# Patient Record
Sex: FEMALE | Race: WHITE | NOT HISPANIC OR LATINO | URBAN - METROPOLITAN AREA
[De-identification: names, ages, dates, MRNs, and addresses within clinical notes are randomized per-mention and may not be internally consistent; named-entity substitution may affect disease eponyms.]

---

## 2023-07-26 ENCOUNTER — INPATIENT (INPATIENT)
Facility: HOSPITAL | Age: 63
LOS: 2 days | Discharge: HOME | End: 2023-07-29
Attending: STUDENT IN AN ORGANIZED HEALTH CARE EDUCATION/TRAINING PROGRAM | Admitting: STUDENT IN AN ORGANIZED HEALTH CARE EDUCATION/TRAINING PROGRAM
Payer: COMMERCIAL

## 2023-07-26 VITALS
RESPIRATION RATE: 20 BRPM | DIASTOLIC BLOOD PRESSURE: 85 MMHG | OXYGEN SATURATION: 100 % | HEART RATE: 133 BPM | SYSTOLIC BLOOD PRESSURE: 114 MMHG | TEMPERATURE: 98 F

## 2023-07-26 LAB
ALBUMIN SERPL ELPH-MCNC: 3.8 G/DL — SIGNIFICANT CHANGE UP (ref 3.3–5)
ALP SERPL-CCNC: 86 U/L — SIGNIFICANT CHANGE UP (ref 40–120)
ALT FLD-CCNC: 38 U/L — HIGH (ref 4–33)
ANION GAP SERPL CALC-SCNC: 14 MMOL/L — SIGNIFICANT CHANGE UP (ref 7–14)
APTT BLD: 27.4 SEC — SIGNIFICANT CHANGE UP (ref 24.5–35.6)
AST SERPL-CCNC: 31 U/L — SIGNIFICANT CHANGE UP (ref 4–32)
BASE EXCESS BLDV CALC-SCNC: 2 MMOL/L — SIGNIFICANT CHANGE UP (ref -2–3)
BASOPHILS # BLD AUTO: 0.07 K/UL — SIGNIFICANT CHANGE UP (ref 0–0.2)
BASOPHILS NFR BLD AUTO: 0.7 % — SIGNIFICANT CHANGE UP (ref 0–2)
BILIRUB SERPL-MCNC: 1.7 MG/DL — HIGH (ref 0.2–1.2)
BLD GP AB SCN SERPL QL: NEGATIVE — SIGNIFICANT CHANGE UP
BLOOD GAS VENOUS COMPREHENSIVE RESULT: SIGNIFICANT CHANGE UP
BUN SERPL-MCNC: 21 MG/DL — SIGNIFICANT CHANGE UP (ref 7–23)
CALCIUM SERPL-MCNC: 10.2 MG/DL — SIGNIFICANT CHANGE UP (ref 8.4–10.5)
CHLORIDE BLDV-SCNC: 99 MMOL/L — SIGNIFICANT CHANGE UP (ref 96–108)
CHLORIDE SERPL-SCNC: 100 MMOL/L — SIGNIFICANT CHANGE UP (ref 98–107)
CO2 BLDV-SCNC: 29.2 MMOL/L — HIGH (ref 22–26)
CO2 SERPL-SCNC: 22 MMOL/L — SIGNIFICANT CHANGE UP (ref 22–31)
CREAT SERPL-MCNC: 0.68 MG/DL — SIGNIFICANT CHANGE UP (ref 0.5–1.3)
EGFR: 97 ML/MIN/1.73M2 — SIGNIFICANT CHANGE UP
EOSINOPHIL # BLD AUTO: 0.12 K/UL — SIGNIFICANT CHANGE UP (ref 0–0.5)
EOSINOPHIL NFR BLD AUTO: 1.2 % — SIGNIFICANT CHANGE UP (ref 0–6)
GAS PNL BLDV: 136 MMOL/L — SIGNIFICANT CHANGE UP (ref 136–145)
GAS PNL BLDV: SIGNIFICANT CHANGE UP
GLUCOSE BLDV-MCNC: 104 MG/DL — HIGH (ref 70–99)
GLUCOSE SERPL-MCNC: 104 MG/DL — HIGH (ref 70–99)
HCO3 BLDV-SCNC: 28 MMOL/L — SIGNIFICANT CHANGE UP (ref 22–29)
HCT VFR BLD CALC: 37.3 % — SIGNIFICANT CHANGE UP (ref 34.5–45)
HCT VFR BLDA CALC: 39 % — SIGNIFICANT CHANGE UP (ref 34.5–46.5)
HGB BLD CALC-MCNC: 13.1 G/DL — SIGNIFICANT CHANGE UP (ref 11.7–16.1)
HGB BLD-MCNC: 12.3 G/DL — SIGNIFICANT CHANGE UP (ref 11.5–15.5)
IANC: 6.05 K/UL — SIGNIFICANT CHANGE UP (ref 1.8–7.4)
IMM GRANULOCYTES NFR BLD AUTO: 0.3 % — SIGNIFICANT CHANGE UP (ref 0–0.9)
INR BLD: 1.19 RATIO — HIGH (ref 0.85–1.18)
LACTATE BLDV-MCNC: 1.7 MMOL/L — SIGNIFICANT CHANGE UP (ref 0.5–2)
LYMPHOCYTES # BLD AUTO: 2.71 K/UL — SIGNIFICANT CHANGE UP (ref 1–3.3)
LYMPHOCYTES # BLD AUTO: 26.1 % — SIGNIFICANT CHANGE UP (ref 13–44)
MCHC RBC-ENTMCNC: 28.2 PG — SIGNIFICANT CHANGE UP (ref 27–34)
MCHC RBC-ENTMCNC: 33 GM/DL — SIGNIFICANT CHANGE UP (ref 32–36)
MCV RBC AUTO: 85.6 FL — SIGNIFICANT CHANGE UP (ref 80–100)
MONOCYTES # BLD AUTO: 1.4 K/UL — HIGH (ref 0–0.9)
MONOCYTES NFR BLD AUTO: 13.5 % — SIGNIFICANT CHANGE UP (ref 2–14)
NEUTROPHILS # BLD AUTO: 6.05 K/UL — SIGNIFICANT CHANGE UP (ref 1.8–7.4)
NEUTROPHILS NFR BLD AUTO: 58.2 % — SIGNIFICANT CHANGE UP (ref 43–77)
NRBC # BLD: 0 /100 WBCS — SIGNIFICANT CHANGE UP (ref 0–0)
NRBC # FLD: 0 K/UL — SIGNIFICANT CHANGE UP (ref 0–0)
NT-PROBNP SERPL-SCNC: 168 PG/ML — SIGNIFICANT CHANGE UP
PCO2 BLDV: 47 MMHG — SIGNIFICANT CHANGE UP (ref 39–52)
PH BLDV: 7.38 — SIGNIFICANT CHANGE UP (ref 7.32–7.43)
PLATELET # BLD AUTO: 307 K/UL — SIGNIFICANT CHANGE UP (ref 150–400)
PO2 BLDV: 28 MMHG — SIGNIFICANT CHANGE UP (ref 25–45)
POTASSIUM BLDV-SCNC: 3.6 MMOL/L — SIGNIFICANT CHANGE UP (ref 3.5–5.1)
POTASSIUM SERPL-MCNC: 3.6 MMOL/L — SIGNIFICANT CHANGE UP (ref 3.5–5.3)
POTASSIUM SERPL-SCNC: 3.6 MMOL/L — SIGNIFICANT CHANGE UP (ref 3.5–5.3)
PROT SERPL-MCNC: 6.8 G/DL — SIGNIFICANT CHANGE UP (ref 6–8.3)
PROTHROM AB SERPL-ACNC: 13.4 SEC — HIGH (ref 9.5–13)
RBC # BLD: 4.36 M/UL — SIGNIFICANT CHANGE UP (ref 3.8–5.2)
RBC # FLD: 11.4 % — SIGNIFICANT CHANGE UP (ref 10.3–14.5)
RH IG SCN BLD-IMP: NEGATIVE — SIGNIFICANT CHANGE UP
SAO2 % BLDV: 41.9 % — LOW (ref 67–88)
SODIUM SERPL-SCNC: 136 MMOL/L — SIGNIFICANT CHANGE UP (ref 135–145)
TROPONIN T, HIGH SENSITIVITY RESULT: 10 NG/L — SIGNIFICANT CHANGE UP
TSH SERPL-MCNC: <0.1 UIU/ML — LOW (ref 0.27–4.2)
WBC # BLD: 10.38 K/UL — SIGNIFICANT CHANGE UP (ref 3.8–10.5)
WBC # FLD AUTO: 10.38 K/UL — SIGNIFICANT CHANGE UP (ref 3.8–10.5)

## 2023-07-26 PROCEDURE — 99285 EMERGENCY DEPT VISIT HI MDM: CPT

## 2023-07-26 PROCEDURE — 93971 EXTREMITY STUDY: CPT | Mod: 26,LT

## 2023-07-26 PROCEDURE — 71045 X-RAY EXAM CHEST 1 VIEW: CPT | Mod: 26

## 2023-07-26 NOTE — ED PROVIDER NOTE - ATTENDING CONTRIBUTION TO CARE
This is a patient who states that she has not seen a doctor in the past few years.  She states for the past few weeks she has been feeling unwell.  For approximately 3 weeks she has been experiencing chest and back pain as well as shortness of breath.  Patient states the symptoms worsen when she goes outside.  States she does not like to take medications so she really has not tried anything for this.  States left lower extremity is slightly more swollen than it has been in the past.  Patient also admits to decreased appetite secondary to being nauseous with intermittent episodes of nonbilious nonbloody vomiting.  States that she does not vomit every day but has been doing so on and off for the past few weeks.  No sick contacts no recent car rides normal flights no history of blood clots  Patient is a former smoker.  Patient does not drink alcohol or use drugs.  Patient tachycardic upon initial assessment with some shortness of breath.  We will check basic labs including troponin and TSH.  Patient does look volume depleted.  Due to tachycardia and shortness of breath with some lower extremity swelling of 1 leg we will pursue CT angio for rule out PE  Is normal patient is normotensive and saturating well on room air.  General: Well appearing, well nourished, in no distress  Head: Normocephalic, atraumatic  Eyes: Conjunctiva clear, sclera non-icteric, EOM intact  Mouth: Mucous membranes dry  Neck: Supple  Heart: tachycardiac, no murmur or gallop  Lungs: Clear to auscultation  Abdomen: Bowel sounds present, soft, mild generalized ttp, non distended  Back: Spine normal without deformity or tenderness, no CVA tenderness  Extremities: No amputations or deformities, cyanosis, L>R non pitting no erythema no warmth   Musculoskeletal: No crepitation, defects or decreased range of motion, strength intact throughout, pulses intact  Neurologic: No gross deficits  Psychiatric: Oriented X3, normal mood and affect  Skin: Warm,dry. Good turgor, no rash, unusual bruising, Cap refill <2 seconds

## 2023-07-26 NOTE — ED ADULT NURSE NOTE - NSFALLUNIVINTERV_ED_ALL_ED
Bed/Stretcher in lowest position, wheels locked, appropriate side rails in place/Call bell, personal items and telephone in reach/Instruct patient to call for assistance before getting out of bed/chair/stretcher/Non-slip footwear applied when patient is off stretcher/[place] to call system/Physically safe environment - no spills, clutter or unnecessary equipment/Purposeful proactive rounding/Room/bathroom lighting operational, light cord in reach

## 2023-07-26 NOTE — ED ADULT TRIAGE NOTE - CHIEF COMPLAINT QUOTE
pt c/o palpitations and chest pain x 3 weeks with dyspnea on exertion. also c/o dizziness and upper back pain. seen by primary Dr today sent in for evaluation, EKG showed sinus tachycardia. denies any other complaints. appears uncomfortable in triage. pt c/o palpitations and chest pain x 3 weeks with dyspnea on exertion. also c/o dizziness and upper back pain. seen by primary Dr today sent in for evaluation, EKG showed sinus tachycardia. denies any other complaints. appears uncomfortable in triage, tachypneic, sating 100%. no PMH

## 2023-07-26 NOTE — ED PROVIDER NOTE - PHYSICAL EXAMINATION
GENERAL: Awake, alert, NAD  LUNGS: CTAB, no wheezes or crackles   CARDIAC: tachy no m/r/g  ABDOMEN: Soft, non tender, non distended, no rebound, no guarding  BACK: No midline spinal tenderness, no CVA tenderness  EXT: Left lower extremity swollen as compared to right  NEURO: A&Ox3. Moving all extremities.  SKIN: Warm and dry. No rash.  PSYCH: Normal affect.

## 2023-07-26 NOTE — ED ADULT NURSE NOTE - OBJECTIVE STATEMENT
Pt c/o of CP that does not radiate or increase with respiratory effort. Pt also endorses middle back pain and BLLE edema at the knees. Denies SOB

## 2023-07-26 NOTE — ED ADULT NURSE NOTE - CHIEF COMPLAINT QUOTE
pt c/o palpitations and chest pain x 3 weeks with dyspnea on exertion. also c/o dizziness and upper back pain. seen by primary Dr today sent in for evaluation, EKG showed sinus tachycardia. denies any other complaints. appears uncomfortable in triage, tachypneic, sating 100%. no PMH

## 2023-07-26 NOTE — ED PROVIDER NOTE - OBJECTIVE STATEMENT
63-year-old female chief complaint chest pain shortness of breath.  Patient has no relevant medical conditions.  Patient does not take any medications or blood thinners.  Patient notes over the past couple weeks she has been having increasing work of breathing in conjunction with excretion Patient is also stating she has been having shortness of breath with exertion as well.  Patient notes her left lower extremity is also more swollen than it has been in the past.  Patient had no recent long car rides or flights.

## 2023-07-26 NOTE — ED PROVIDER NOTE - CLINICAL SUMMARY MEDICAL DECISION MAKING FREE TEXT BOX
Given history and physical possible hypovolemia versus PE versus arrhythmia versus less likely being infectious in etiology given the lack of symptoms patient has been having nausea and vomiting so more likely dehydration but will have to work-up PE given patient has new left lower extremity swelling.  Probable admission for echo given patient has not had a cardiac work-up done

## 2023-07-27 DIAGNOSIS — E05.90 THYROTOXICOSIS, UNSPECIFIED WITHOUT THYROTOXIC CRISIS OR STORM: ICD-10-CM

## 2023-07-27 DIAGNOSIS — R09.89 OTHER SPECIFIED SYMPTOMS AND SIGNS INVOLVING THE CIRCULATORY AND RESPIRATORY SYSTEMS: ICD-10-CM

## 2023-07-27 DIAGNOSIS — Z98.890 OTHER SPECIFIED POSTPROCEDURAL STATES: Chronic | ICD-10-CM

## 2023-07-27 DIAGNOSIS — R42 DIZZINESS AND GIDDINESS: ICD-10-CM

## 2023-07-27 DIAGNOSIS — R00.0 TACHYCARDIA, UNSPECIFIED: ICD-10-CM

## 2023-07-27 DIAGNOSIS — Z29.9 ENCOUNTER FOR PROPHYLACTIC MEASURES, UNSPECIFIED: ICD-10-CM

## 2023-07-27 DIAGNOSIS — Z98.891 HISTORY OF UTERINE SCAR FROM PREVIOUS SURGERY: Chronic | ICD-10-CM

## 2023-07-27 DIAGNOSIS — R79.89 OTHER SPECIFIED ABNORMAL FINDINGS OF BLOOD CHEMISTRY: ICD-10-CM

## 2023-07-27 LAB
ALBUMIN SERPL ELPH-MCNC: 3.9 G/DL — SIGNIFICANT CHANGE UP (ref 3.3–5)
ALP SERPL-CCNC: 90 U/L — SIGNIFICANT CHANGE UP (ref 40–120)
ALT FLD-CCNC: 35 U/L — HIGH (ref 4–33)
ANION GAP SERPL CALC-SCNC: 18 MMOL/L — HIGH (ref 7–14)
AST SERPL-CCNC: 29 U/L — SIGNIFICANT CHANGE UP (ref 4–32)
BILIRUB SERPL-MCNC: 1.8 MG/DL — HIGH (ref 0.2–1.2)
BUN SERPL-MCNC: 18 MG/DL — SIGNIFICANT CHANGE UP (ref 7–23)
CALCIUM SERPL-MCNC: 10.3 MG/DL — SIGNIFICANT CHANGE UP (ref 8.4–10.5)
CHLORIDE SERPL-SCNC: 100 MMOL/L — SIGNIFICANT CHANGE UP (ref 98–107)
CO2 SERPL-SCNC: 22 MMOL/L — SIGNIFICANT CHANGE UP (ref 22–31)
CREAT SERPL-MCNC: 0.61 MG/DL — SIGNIFICANT CHANGE UP (ref 0.5–1.3)
EGFR: 100 ML/MIN/1.73M2 — SIGNIFICANT CHANGE UP
GLUCOSE SERPL-MCNC: 125 MG/DL — HIGH (ref 70–99)
HCT VFR BLD CALC: 36.3 % — SIGNIFICANT CHANGE UP (ref 34.5–45)
HGB BLD-MCNC: 12.1 G/DL — SIGNIFICANT CHANGE UP (ref 11.5–15.5)
MCHC RBC-ENTMCNC: 28.5 PG — SIGNIFICANT CHANGE UP (ref 27–34)
MCHC RBC-ENTMCNC: 33.3 GM/DL — SIGNIFICANT CHANGE UP (ref 32–36)
MCV RBC AUTO: 85.6 FL — SIGNIFICANT CHANGE UP (ref 80–100)
NRBC # BLD: 0 /100 WBCS — SIGNIFICANT CHANGE UP (ref 0–0)
NRBC # FLD: 0 K/UL — SIGNIFICANT CHANGE UP (ref 0–0)
PLATELET # BLD AUTO: 277 K/UL — SIGNIFICANT CHANGE UP (ref 150–400)
POTASSIUM SERPL-MCNC: 3.6 MMOL/L — SIGNIFICANT CHANGE UP (ref 3.5–5.3)
POTASSIUM SERPL-SCNC: 3.6 MMOL/L — SIGNIFICANT CHANGE UP (ref 3.5–5.3)
PROT SERPL-MCNC: 7.2 G/DL — SIGNIFICANT CHANGE UP (ref 6–8.3)
RBC # BLD: 4.24 M/UL — SIGNIFICANT CHANGE UP (ref 3.8–5.2)
RBC # FLD: 11.6 % — SIGNIFICANT CHANGE UP (ref 10.3–14.5)
SODIUM SERPL-SCNC: 140 MMOL/L — SIGNIFICANT CHANGE UP (ref 135–145)
T4 FREE SERPL-MCNC: 3.3 NG/DL — HIGH (ref 0.9–1.8)
TROPONIN T, HIGH SENSITIVITY RESULT: 10 NG/L — SIGNIFICANT CHANGE UP
WBC # BLD: 6.87 K/UL — SIGNIFICANT CHANGE UP (ref 3.8–10.5)
WBC # FLD AUTO: 6.87 K/UL — SIGNIFICANT CHANGE UP (ref 3.8–10.5)

## 2023-07-27 PROCEDURE — 12345: CPT | Mod: NC,GC

## 2023-07-27 PROCEDURE — 99223 1ST HOSP IP/OBS HIGH 75: CPT | Mod: GC

## 2023-07-27 PROCEDURE — 71275 CT ANGIOGRAPHY CHEST: CPT | Mod: 26,MA

## 2023-07-27 PROCEDURE — 76536 US EXAM OF HEAD AND NECK: CPT | Mod: 26

## 2023-07-27 PROCEDURE — 93306 TTE W/DOPPLER COMPLETE: CPT | Mod: 26

## 2023-07-27 PROCEDURE — 99223 1ST HOSP IP/OBS HIGH 75: CPT

## 2023-07-27 RX ORDER — ACETAMINOPHEN 500 MG
650 TABLET ORAL ONCE
Refills: 0 | Status: COMPLETED | OUTPATIENT
Start: 2023-07-27 | End: 2023-07-27

## 2023-07-27 RX ORDER — LIDOCAINE 4 G/100G
1 CREAM TOPICAL ONCE
Refills: 0 | Status: COMPLETED | OUTPATIENT
Start: 2023-07-27 | End: 2023-07-27

## 2023-07-27 RX ORDER — METOPROLOL TARTRATE 50 MG
12.5 TABLET ORAL EVERY 12 HOURS
Refills: 0 | Status: DISCONTINUED | OUTPATIENT
Start: 2023-07-27 | End: 2023-07-29

## 2023-07-27 RX ORDER — SODIUM CHLORIDE 9 MG/ML
1000 INJECTION, SOLUTION INTRAVENOUS ONCE
Refills: 0 | Status: COMPLETED | OUTPATIENT
Start: 2023-07-27 | End: 2023-07-27

## 2023-07-27 RX ORDER — CYCLOBENZAPRINE HYDROCHLORIDE 10 MG/1
5 TABLET, FILM COATED ORAL ONCE
Refills: 0 | Status: COMPLETED | OUTPATIENT
Start: 2023-07-27 | End: 2023-07-27

## 2023-07-27 RX ADMIN — SODIUM CHLORIDE 1000 MILLILITER(S): 9 INJECTION, SOLUTION INTRAVENOUS at 01:22

## 2023-07-27 RX ADMIN — CYCLOBENZAPRINE HYDROCHLORIDE 5 MILLIGRAM(S): 10 TABLET, FILM COATED ORAL at 01:58

## 2023-07-27 RX ADMIN — Medication 12.5 MILLIGRAM(S): at 17:31

## 2023-07-27 NOTE — CONSULT NOTE ADULT - ATTENDING COMMENTS
63F with new hyperthyroidism, start low dose beta blocker for symptoms.  Differential Graves disease vs hot nodule vs thyroiditis.  Thyroid nodules seen on ultrasound can be further assessed as outpatient.  Uptake and scan to be considered as outpatient (cannot do now due to receiving iodinated contrast), FNA would only be considered if uptake and scan show a cold nodule. Follow up antibodies. Await free T4 to decide about methimazole. Mild elevation bilirubin noted.    Domingo Bee MD  Division of Endocrinology  Pager: [phone]    If after 6PM or before 9AM, or on weekends/holidays, please call endocrine answering service for assistance ([phone]).  For nonurgent matters email [email or web address] for assistance. 63F with new hyperthyroidism, start low dose beta blocker for symptoms.  Differential Graves disease vs hot nodule vs thyroiditis.  Thyroid nodules seen on ultrasound can be further assessed as outpatient.  Uptake and scan to be considered as outpatient (cannot do now due to receiving iodinated contrast), FNA would only be considered if uptake and scan show a cold nodule. Follow up antibodies. Await free T4 to decide about methimazole. Mild elevation bilirubin noted.  Patient would like to follow up in [place] with endocrine.    Domingo Bee MD  Division of Endocrinology  Pager: [phone]    If after 6PM or before 9AM, or on weekends/holidays, please call endocrine answering service for assistance ([phone]).  For nonurgent matters email [email or web address] for assistance.

## 2023-07-27 NOTE — H&P ADULT - ASSESSMENT
Pt is a 63-year-old female with no known PMHx who comes in with dizziness, nausea, vomiting over the past several weeks. Found to have elevated T3 and T4, c/f thyrotoxicosis

## 2023-07-27 NOTE — ED ADULT NURSE REASSESSMENT NOTE - NS ED NURSE REASSESS COMMENT FT1
Break coverage RN: pt A&Ox4 resting on stretcher. Respirations even and unlabored. Pt c/o lower back pain - medication administered per order for pain. Pt offers no additional complaints at this time. IV site patent - no redness or swelling noted. pt well appearing. pending transport to bed assignment.

## 2023-07-27 NOTE — H&P ADULT - NSHPLABSRESULTS_GEN_ALL_CORE
LABS:                          12.3   10.38 )-----------( 307      ( 26 Jul 2023 21:14 )             37.3     07-26    136  |  100  |  21  ----------------------------<  104<H>  3.6   |  22  |  0.68    Ca    10.2      26 Jul 2023 21:14    TPro  6.8  /  Alb  3.8  /  TBili  1.7<H>  /  DBili  x   /  AST  31  /  ALT  38<H>  /  AlkPhos  86  07-26    LIVER FUNCTIONS - ( 26 Jul 2023 21:14 )  Alb: 3.8 g/dL / Pro: 6.8 g/dL / ALK PHOS: 86 U/L / ALT: 38 U/L / AST: 31 U/L / GGT: x           PT/INR - ( 26 Jul 2023 21:14 )   PT: 13.4 sec;   INR: 1.19 ratio         PTT - ( 26 Jul 2023 21:14 )  PTT:27.4 sec  Urinalysis Basic - ( 26 Jul 2023 21:14 )    Color: x / Appearance: x / SG: x / pH: x  Gluc: 104 mg/dL / Ketone: x  / Bili: x / Urobili: x   Blood: x / Protein: x / Nitrite: x   Leuk Esterase: x / RBC: x / WBC x   Sq Epi: x / Non Sq Epi: x / Bacteria: x LABS:                          12.3   10.38 )-----------( 307      ( 26 Jul 2023 21:14 )             37.3     07-26    136  |  100  |  21  ----------------------------<  104<H>  3.6   |  22  |  0.68    Ca    10.2      26 Jul 2023 21:14    TPro  6.8  /  Alb  3.8  /  TBili  1.7<H>  /  DBili  x   /  AST  31  /  ALT  38<H>  /  AlkPhos  86  07-26    Lipase: 29 U/L (07.26.23 @ 21:14)    PT/INR - ( 26 Jul 2023 21:14 )   PT: 13.4 sec;   INR: 1.19 ratio    PTT - ( 26 Jul 2023 21:14 )  PTT:27.4 sec    Thyroid Stimulating Hormone, Serum: <0.10 uIU/mL (07.26.23 @ 21:14)  T4, Serum: 13.87 ug/dL (07.26.23 @ 21:14)  Triiodothyronine, Total (T3 Total): 348 ng/dL (07.26.23 @ 21:14)    Troponin T, High Sensitivity Result: 10 ng/L (07.27.23 @ 01:53)  Troponin T, High Sensitivity Result: 10 ng/L (07.26.23 @ 21:14)    Pro-Brain Natriuretic Peptide: 168 pg/mL (07.26.23 @ 21:14)    < from: CT Angio Chest PE Protocol w/ IV Cont (07.27.23 @ 00:39) >  LUNGS AND AIRWAYS: Patent central airways. No lung consolidation, suspicious nodule, or mass. Few scattered calcified granuloma. Mild bibasilar dependent and platelike atelectasis.  PLEURA: No pleural effusion.  MEDIASTINUM AND PAPI: No lymphadenopathy.  VESSELS: Adequate pulmonary arterial opacification. Evaluation of subsegmental branches degraded by motion and mixing artifact. No pulmonary embolism in the main pulmonary arteries, lobar branches, and segmental branches. Main pulmonary artery is not dilated. Thoracic aorta is normal in caliber.  HEART: Heart size is normal. No pericardial effusion.  CHEST WALL AND LOWER NECK: Within normal limits.  VISUALIZED UPPER ABDOMEN: Within normal limits.  BONES: Degenerative changes of the spine.  IMPRESSION: No pulmonary embolism. No acute parenchymal or pleural disease.   < end of copied text > LABS:                          12.3   10.38 )-----------( 307      ( 26 Jul 2023 21:14 )             37.3     07-26    136  |  100  |  21  ----------------------------<  104<H>  3.6   |  22  |  0.68    Ca    10.2      26 Jul 2023 21:14    TPro  6.8  /  Alb  3.8  /  TBili  1.7<H>  /  DBili  x   /  AST  31  /  ALT  38<H>  /  AlkPhos  86  07-26    Lipase: 29 U/L (07.26.23 @ 21:14)    PT/INR - ( 26 Jul 2023 21:14 )   PT: 13.4 sec;   INR: 1.19 ratio    PTT - ( 26 Jul 2023 21:14 )  PTT:27.4 sec    Thyroid Stimulating Hormone, Serum: <0.10 uIU/mL (07.26.23 @ 21:14)  T4, Serum: 13.87 ug/dL (07.26.23 @ 21:14)  Triiodothyronine, Total (T3 Total): 348 ng/dL (07.26.23 @ 21:14)    Troponin T, High Sensitivity Result: 10 ng/L (07.27.23 @ 01:53)  Troponin T, High Sensitivity Result: 10 ng/L (07.26.23 @ 21:14)    Pro-Brain Natriuretic Peptide: 168 pg/mL (07.26.23 @ 21:14)    < from: CT Angio Chest PE Protocol w/ IV Cont (07.27.23 @ 00:39) >  LUNGS AND AIRWAYS: Patent central airways. No lung consolidation, suspicious nodule, or mass. Few scattered calcified granuloma. Mild bibasilar dependent and platelike atelectasis.  PLEURA: No pleural effusion.  MEDIASTINUM AND PAPI: No lymphadenopathy.  VESSELS: Adequate pulmonary arterial opacification. Evaluation of subsegmental branches degraded by motion and mixing artifact. No pulmonary embolism in the main pulmonary arteries, lobar branches, and segmental branches. Main pulmonary artery is not dilated. Thoracic aorta is normal in caliber.  HEART: Heart size is normal. No pericardial effusion.  CHEST WALL AND LOWER NECK: Within normal limits.  VISUALIZED UPPER ABDOMEN: Within normal limits.  BONES: Degenerative changes of the spine.  IMPRESSION: No pulmonary embolism. No acute parenchymal or pleural disease.   < end of copied text >    EKG personally reviewed and interpreted - ST 124bpm, QTc 433ms

## 2023-07-27 NOTE — PATIENT PROFILE ADULT - FALL HARM RISK - UNIVERSAL INTERVENTIONS
Bed in lowest position, wheels locked, appropriate side rails in place/Call bell, personal items and telephone in reach/Instruct patient to call for assistance before getting out of bed or chair/Non-slip footwear when patient is out of bed/[place] to call system/Physically safe environment - no spills, clutter or unnecessary equipment/Purposeful Proactive Rounding/Room/bathroom lighting operational, light cord in reach

## 2023-07-27 NOTE — H&P ADULT - NSHPPHYSICALEXAM_GEN_ALL_CORE
Vital Signs Last 24 Hrs  T(C): 36.3 (27 Jul 2023 03:49), Max: 37 (27 Jul 2023 01:15)  T(F): 97.3 (27 Jul 2023 03:49), Max: 98.6 (27 Jul 2023 01:15)  HR: 77 (27 Jul 2023 03:49) (77 - 133)  BP: 108/59 (27 Jul 2023 03:49) (108/59 - 136/80)  BP(mean): --  RR: 15 (27 Jul 2023 03:49) (15 - 20)  SpO2: 96% (27 Jul 2023 03:49) (96% - 100%)    Parameters below as of 27 Jul 2023 03:49  Patient On (Oxygen Delivery Method): room air        CONSTITUTIONAL: Well-groomed, in no apparent distress  EYES: No conjunctival or scleral injection, non-icteric;   ENMT: No external nasal lesions; MMM  NECK: Trachea midline without palpable neck mass; thyroid not enlarged and non-tender  RESPIRATORY: Breathing comfortably; no dullness to percussion; lungs CTA without wheeze/rhonchi/rales  CARDIOVASCULAR: +S1S2, RRR, no M/G/R; pedal pulses full and symmetric; no lower extremity edema  GASTROINTESTINAL: No palpable masses or tenderness, +BS throughout, no rebound/guarding; no hepatosplenomegaly; no hernia palpated  LYMPHATIC: No cervical LAD or tenderness  SKIN: No rashes or ulcers noted  NEUROLOGIC: CN II-XII intact; sensation intact in LEs b/l to light touch  PSYCHIATRIC: A+O x 3; mood and affect appropriate; appropriate insight and judgment Vital Signs Last 24 Hrs  T(C): 36.3 (27 Jul 2023 03:49), Max: 37 (27 Jul 2023 01:15)  T(F): 97.3 (27 Jul 2023 03:49), Max: 98.6 (27 Jul 2023 01:15)  HR: 77 (27 Jul 2023 03:49) (77 - 133)  BP: 108/59 (27 Jul 2023 03:49) (108/59 - 136/80)  BP(mean): --  RR: 15 (27 Jul 2023 03:49) (15 - 20)  SpO2: 96% (27 Jul 2023 03:49) (96% - 100%)    Parameters below as of 27 Jul 2023 03:49  Patient On (Oxygen Delivery Method): room air        CONSTITUTIONAL: Well-groomed, in no apparent distress  EYES: No conjunctival or scleral injection, non-icteric   ENMT: No external nasal lesions; MMM  NECK: Trachea midline without palpable neck mass; thyroid not enlarged and non-tender  RESPIRATORY: Breathing comfortably; lungs CTA without wheeze/rhonchi/rales  CARDIOVASCULAR: +S1S2, RRR, no M/G/R; no lower extremity edema  GASTROINTESTINAL: No palpable masses or tenderness, +BS throughout, no rebound/guarding  LYMPHATIC: No cervical LAD or tenderness  SKIN: No rashes or ulcers noted  NEUROLOGIC: CN II-XII nonfocal, sensation intact in LEs b/l to light touch  PSYCHIATRIC: A+O x 3; mood and affect appropriate; appropriate insight and judgment Vital Signs Last 24 Hrs  T(C): 36.3 (27 Jul 2023 03:49), Max: 37 (27 Jul 2023 01:15)  T(F): 97.3 (27 Jul 2023 03:49), Max: 98.6 (27 Jul 2023 01:15)  HR: 77 (27 Jul 2023 03:49) (77 - 133)  BP: 108/59 (27 Jul 2023 03:49) (108/59 - 136/80)  RR: 15 (27 Jul 2023 03:49) (15 - 20)  SpO2: 96% (27 Jul 2023 03:49) (96% - 100%)    Parameters below as of 27 Jul 2023 03:49  Patient On (Oxygen Delivery Method): room air    CONSTITUTIONAL: Well-groomed, in no apparent distress  EYES: No conjunctival or scleral injection, non-icteric   ENMT: No external nasal lesions; MMM  NECK: Trachea midline without palpable neck mass; thyroid not enlarged and non-tender  RESPIRATORY: Breathing comfortably; lungs CTA without wheeze/rhonchi/rales  CARDIOVASCULAR: +S1S2, RRR, no M/G/R; no lower extremity edema  GASTROINTESTINAL: No palpable masses or tenderness, +BS throughout, no rebound/guarding  LYMPHATIC: No cervical LAD or tenderness  SKIN - limited skin exam: No rashes or ulcers noted   NEUROLOGIC: CN II-XII nonfocal, sensation intact, strength 5/5 x4 extremities, FTN intact b/l  PSYCHIATRIC: A+O x 3; mood and affect appropriate; appropriate insight and judgment

## 2023-07-27 NOTE — CONSULT NOTE ADULT - ASSESSMENT
63F with no known PMHx who was in usual state of health until developing n/v, dizziness, palpitations, SOB on exertion 3 weeks ago. Found to have hyperthyroidism with TSH <0.10, FT4 pending.    #Hyperthyroidism  TSH <0.10, TT3 elevated 348  TUS: hyperemic thyroid, L solid nodule 1.8 x 1.6, R hypoechoic nodule 0.4 x 0.4 x 0.5  Most likely Grave's disease given overall hyperemic thyroid.  Could be also be hyperfunctioning thyroid nodule but less likely.  Still remains tachycardic up to 100s at rest, worsens with movement/exertion.  - Recommend start metoprolol 12.5mg BID. Monitor BP.  - Check TSH receptor, TSI - high sensitivity, specificity for Grave's  - Follow up FT4 - degree of elevation will determine treatment  - Needs [place] follow up with [place]ocrin[place] [facility] [place] to schedule appointment.  - Thyroid nodules do not need FNA at this time.  - Received contrast so cannot perform Thyroid Uptake Scan until 6 weeks later (outpatient).  - Trend CBC, LFT's as this is important to monitor in setting of potential thionamide use    #Tachycardia  - Recommend start metoprolol 12.5mg BID. Monitor BP.    #Elevated LFT  - Trend LFT    Discussed with Dr. Jacob Farrar MD  Endocrine Fellow  Can be reached via teams. For follow up questions, discharge recommendations, or new consults, please call answering service at [phone] (weekdays); [phone] (nights/weekends). 63F with no known PMHx who was in usual state of health until developing n/v, dizziness, palpitations, SOB on exertion 3 weeks ago. Found to have hyperthyroidism with TSH <0.10, FT4 pending.    #Hyperthyroidism  TSH <0.10, TT3 elevated 348  TUS: hyperemic thyroid, L solid nodule 1.8 x 1.6, R hypoechoic nodule 0.4 x 0.4 x 0.5  Most likely Grave's disease given overall hyperemic thyroid.  Could be also be hyperfunctioning thyroid nodule but less likely.  Still remains tachycardic up to 100s at rest, worsens with movement/exertion.  - Recommend start metoprolol 12.5mg BID. Monitor BP.  - Check TSH receptor, TSI - high sensitivity, specificity for Grave's  - Follow up Free T4 - degree of elevation will determine treatment  - Needs outpatient follow up with endocrinology; with [place]. [place] [facility] [place], we will email to schedule appointment.  - Thyroid nodules do not need FNA at this time.  - Received contrast so cannot perform Thyroid Uptake Scan until 6 weeks later (outpatient).  - Trend CBC, LFT's as this is important to monitor in setting of potential thionamide use    #Tachycardia  - Recommend start metoprolol 12.5mg BID. Monitor BP.    #Elevated LFT  - Trend LFT    Discussed with Dr. Jacob Farrar MD  Endocrine Fellow  Can be reached via teams. For follow up questions, discharge recommendations, or new consults, please call answering service at [phone] (weekdays); [phone] (nights/weekends).

## 2023-07-27 NOTE — H&P ADULT - NSHPOUTPATIENTPROVIDERS_GEN_ALL_CORE
Pt received Taxol, carbo, and IVF; tolerated well. Pt with increased BP before, and during Taxol. Dr. Weeks notified and PO clonidine ordered, BP resolved. NAD. Pt instructed to call MD with any concerns. Pt discharged home independently.     Problem: Anemia (Chemotherapy Effects)  Goal: Anemia Symptom Improvement  Outcome: Ongoing, Progressing     Problem: Neurotoxicity (Chemotherapy Effects)  Goal: Neurotoxicity Symptom Control  Outcome: Ongoing, Progressing     Problem: Neutropenia (Chemotherapy Effects)  Goal: Absence of Infection  Outcome: Ongoing, Progressing      [facility] - Dr. Ma

## 2023-07-27 NOTE — H&P ADULT - PROBLEM SELECTOR PLAN 2
Diet: regular  DVT: SCD  Dispo: pending clinical work up Diet: regular  DVT: SCDs  Dispo: pending clinical work up not vertiginous in quality   possibly related to tachycardia  check orthostatics   fall precautions   no focal neuro deficits   monitor on tele  pro-BNP wnl

## 2023-07-27 NOTE — H&P ADULT - NSHPSOCIALHISTORY_GEN_ALL_CORE
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

## 2023-07-27 NOTE — PROGRESS NOTE ADULT - SUBJECTIVE AND OBJECTIVE BOX
Patient is a 63y old  Female who presents with a chief complaint of     SUBJECTIVE / OVERNIGHT EVENTS:    MEDICATIONS  (STANDING):    MEDICATIONS  (PRN):      CAPILLARY BLOOD GLUCOSE        I&O's Summary      PHYSICAL EXAM:  Vital Signs Last 24 Hrs  T(C): 36 (27 Jul 2023 06:36), Max: 37 (27 Jul 2023 01:15)  T(F): 96.8 (27 Jul 2023 06:36), Max: 98.6 (27 Jul 2023 01:15)  HR: 65 (27 Jul 2023 06:36) (65 - 133)  BP: 115/64 (27 Jul 2023 06:36) (108/59 - 136/80)  BP(mean): --  RR: 18 (27 Jul 2023 06:36) (15 - 20)  SpO2: 98% (27 Jul 2023 06:36) (96% - 100%)    Parameters below as of 27 Jul 2023 06:36  Patient On (Oxygen Delivery Method): room air        GENERAL: No acute distress, well-developed  HEAD:  Atraumatic, Normocephalic  EYES: EOMI, PERRLA, conjunctiva and sclera clear  NECK: Supple, no lymphadenopathy, no JVD  CHEST/LUNG: CTAB; No wheezes, rales, or rhonchi  HEART: Regular rate and rhythm; No murmurs, rubs, or gallops  ABDOMEN: Soft, non-tender, non-distended; normal bowel sounds, no organomegaly  EXTREMITIES:  2+ peripheral pulses b/l, No clubbing, cyanosis, or edema  NEUROLOGY: A&O x 3, no focal deficits  SKIN: No rashes or lesions    LABS:                        12.3   10.38 )-----------( 307      ( 26 Jul 2023 21:14 )             37.3     07-26    136  |  100  |  21  ----------------------------<  104<H>  3.6   |  22  |  0.68    Ca    10.2      26 Jul 2023 21:14    TPro  6.8  /  Alb  3.8  /  TBili  1.7<H>  /  DBili  x   /  AST  31  /  ALT  38<H>  /  AlkPhos  86  07-26    PT/INR - ( 26 Jul 2023 21:14 )   PT: 13.4 sec;   INR: 1.19 ratio         PTT - ( 26 Jul 2023 21:14 )  PTT:27.4 sec      Urinalysis Basic - ( 26 Jul 2023 21:14 )    Color: x / Appearance: x / SG: x / pH: x  Gluc: 104 mg/dL / Ketone: x  / Bili: x / Urobili: x   Blood: x / Protein: x / Nitrite: x   Leuk Esterase: x / RBC: x / WBC x   Sq Epi: x / Non Sq Epi: x / Bacteria: x          RADIOLOGY & ADDITIONAL TESTS:  Results Reviewed:   Imaging Personally Reviewed:  Electrocardiogram Personally Reviewed:    COORDINATION OF CARE:  Care Discussed with Consultants/Other Providers [Y/N]:  Prior or Outpatient Records Reviewed [Y/N]:   Patient is a 63y old  Female who presents with a chief complaint of dizziness    SUBJECTIVE / OVERNIGHT EVENTS:    No acute events overnight. No further tachycardia.     MEDICATIONS  (STANDING):    MEDICATIONS  (PRN):      CAPILLARY BLOOD GLUCOSE        I&O's Summary      PHYSICAL EXAM:  Vital Signs Last 24 Hrs  T(C): 36 (27 Jul 2023 06:36), Max: 37 (27 Jul 2023 01:15)  T(F): 96.8 (27 Jul 2023 06:36), Max: 98.6 (27 Jul 2023 01:15)  HR: 65 (27 Jul 2023 06:36) (65 - 133)  BP: 115/64 (27 Jul 2023 06:36) (108/59 - 136/80)  BP(mean): --  RR: 18 (27 Jul 2023 06:36) (15 - 20)  SpO2: 98% (27 Jul 2023 06:36) (96% - 100%)    Parameters below as of 27 Jul 2023 06:36  Patient On (Oxygen Delivery Method): room air        GENERAL: No acute distress, well-developed  HEAD:  Atraumatic, Normocephalic  EYES: EOMI, PERRLA, conjunctiva and sclera clear  NECK: Supple, no lymphadenopathy, no JVD; non-enlarged thyroid, non-tender  CHEST/LUNG: CTAB; No wheezes, rales, or rhonchi  HEART: Regular rate and rhythm; No murmurs, rubs, or gallops  ABDOMEN: Soft, non-tender, non-distended; normal bowel sounds, no organomegaly  EXTREMITIES:  2+ peripheral pulses b/l, No clubbing, cyanosis, or edema  NEUROLOGY: A&O x 3, no focal deficits  SKIN: No rashes or lesions    LABS:                        12.3   10.38 )-----------( 307      ( 26 Jul 2023 21:14 )             37.3     07-26    136  |  100  |  21  ----------------------------<  104<H>  3.6   |  22  |  0.68    Ca    10.2      26 Jul 2023 21:14    TPro  6.8  /  Alb  3.8  /  TBili  1.7<H>  /  DBili  x   /  AST  31  /  ALT  38<H>  /  AlkPhos  86  07-26    PT/INR - ( 26 Jul 2023 21:14 )   PT: 13.4 sec;   INR: 1.19 ratio         PTT - ( 26 Jul 2023 21:14 )  PTT:27.4 sec      Urinalysis Basic - ( 26 Jul 2023 21:14 )    Color: x / Appearance: x / SG: x / pH: x  Gluc: 104 mg/dL / Ketone: x  / Bili: x / Urobili: x   Blood: x / Protein: x / Nitrite: x   Leuk Esterase: x / RBC: x / WBC x   Sq Epi: x / Non Sq Epi: x / Bacteria: x          RADIOLOGY & ADDITIONAL TESTS:  Results Reviewed:   Imaging Personally Reviewed:  Electrocardiogram Personally Reviewed:    COORDINATION OF CARE:  Care Discussed with Consultants/Other Providers [Y/N]:  Prior or Outpatient Records Reviewed [Y/N]:

## 2023-07-27 NOTE — H&P ADULT - HISTORY OF PRESENT ILLNESS
Pt is a 63-year-old female with no known PMHx who comes in with dyspnea and chest pain.  Patient notes over the past couple weeks she has been having increasing work of breathing with exertion. Patient notes her left lower extremity is also more swollen than it has been in the past.   Pt is a 63-year-old female with no known PMHx who comes in with dizziness, nausea, vomiting over the past several weeks. She initially attributed her symptoms to the heat and weather. She reports that she would intermittently experience these symptoms for a day, it would tianna and then reoccur the following day.  Patient notes her left lower extremity is also more swollen than it has been in the past.   Pt denied chest pain and SOB but endorsed palpitations which have been ongoing as well for the past few weeks.   Pt denies recent travel or sick contacts.    Pt is a 63-year-old female with no known PMHx who comes in with dizziness, nausea, vomiting over the past three weeks. She initially attributed her symptoms to the heat and weather. She reports that she would intermittently experience these symptoms for a day, it would tianna and then reoccur the following day.  Patient notes her left lower extremity is also more swollen than it has been in the past. She denies any history of VTE.   Pt denied chest pain and SOB but endorsed palpitations which have been ongoing as well for the past few weeks.   Pt denies recent travel or sick contacts.

## 2023-07-27 NOTE — H&P ADULT - ATTENDING COMMENTS
Agree with resident H&P and plan as edited above.     63F w/no PMH presenting with thyrotoxicosis, Matamoros-Wartofsky score on presentation was 30, currently only 5. Endo consult appreciated. TSH receptor Ab and TSI pending. Free T4 added onto labs. Monitor on tele. Add BB as needed for HR control. check orthostatics. F/u full endo recs. Agree with resident H&P and plan as edited above.     63F w/no PMH presenting with thyrotoxicosis, Matamoros-Wartofsky score on presentation was 30, currently only 5. Endo consult appreciated. TSH receptor Ab and TSI ordered. Free T4 added onto labs. Monitor on tele. Add BB as needed for HR control. check orthostatics. F/u full endo recs.

## 2023-07-27 NOTE — H&P ADULT - NSHPREVIEWOFSYSTEMS_GEN_ALL_CORE
REVIEW OF SYSTEMS:    CONSTITUTIONAL: No weakness, fevers or chills  EYES/ENT: No visual changes;  No vertigo or throat pain   NECK: No pain or stiffness  RESPIRATORY: No cough, wheezing, hemoptysis; No shortness of breath  CARDIOVASCULAR: No chest pain or palpitations  GASTROINTESTINAL: No abdominal or epigastric pain. No nausea, vomiting, or hematemesis; No diarrhea or constipation. No melena or hematochezia.  GENITOURINARY: No dysuria, frequency or hematuria  NEUROLOGICAL: No numbness or weakness  SKIN: No itching, rashes REVIEW OF SYSTEMS:    CONSTITUTIONAL: +dizziness, but  no weakness, fevers or chills  EYES/ENT: No visual changes;  No vertigo or throat pain   NECK: No pain or stiffness  RESPIRATORY: No cough, wheezing, hemoptysis; No shortness of breath  CARDIOVASCULAR: +palpitations but no chest pain   GASTROINTESTINAL: +nausea, vomiting, No abdominal or epigastric pain; No diarrhea or constipation. No melena or hematochezia.  GENITOURINARY: No dysuria, frequency or hematuria  NEUROLOGICAL: No numbness or weakness  SKIN: No itching, rashes REVIEW OF SYSTEMS:    CONSTITUTIONAL: No weakness, fevers or chills; No night sweats; (+)decreased appetite x3 weeks. No known weight loss  EYES/ENT: No visual changes;  No vertigo or throat pain, no rhinorrhea; No sinus pain/pressure; No dysphagia  NECK: No pain or stiffness  RESPIRATORY: No cough, wheezing, hemoptysis; No shortness of breath  CARDIOVASCULAR: +palpitations but no chest pain; (+)LLE edema  GASTROINTESTINAL: +nausea, vomiting (white phlegm); No hemoptysis; No abdomina pain; No diarrhea; (+) constipation, last BM 2d ago. No melena or hematochezia. Reports feeling of food being "stuck" in lower sternum  GENITOURINARY: No dysuria, frequency or hematuria  NEUROLOGICAL: No numbness, paresthesias, or weakness; No syncope; No vertigo  SKIN: No itching, rashes  ENDO: (+)constipation, No hair loss, no xerosis REVIEW OF SYSTEMS:    CONSTITUTIONAL: No weakness, fevers or chills; No night sweats; (+)decreased appetite x3 weeks. No known weight loss  EYES/ENT: No visual changes;  No vertigo or throat pain, no rhinorrhea; No sinus pain/pressure; No dysphagia  NECK: No pain or stiffness  RESPIRATORY: No cough, wheezing, hemoptysis; No shortness of breath  CARDIOVASCULAR: +palpitations but no chest pain; (+)LLE edema  GASTROINTESTINAL: +nausea, vomiting (white phlegm); No hemoptysis; No abdomina pain; No diarrhea; (+) constipation, last BM 2d ago. No melena or hematochezia. Reports feeling of food being "stuck" in lower sternum  GENITOURINARY: No dysuria, frequency or hematuria  NEUROLOGICAL: No numbness, paresthesias, or weakness; No syncope; No vertigo  MSK: ambulates without aid; No falls  SKIN: No itching, rashes  ENDO: (+)constipation, No hair loss, no xerosis

## 2023-07-27 NOTE — H&P ADULT - PROBLEM SELECTOR PLAN 1
Pt with nausea, vomiting, and palpitations  -T3: 348  -T4: 13.87  -ordered US thyroid  -Endocrine consult in the AM   Matamoros-Wartofsky Point Scale (BWPS) for Thyrotoxicosis: 30 points, suggestive of impending thyroid storm during initial evaluation   -monitor on telemetry  -trend bilirubin, can fractionate if it remains elevated  -consider adding propanolol if HR remains elevated and BP can tolerate Pt with nausea, vomiting, and palpitations  -T3: 348  -T4: 13.87  -ordered US thyroid  -Endocrine consult in the AM   Matamoros-Wartofsky Point Scale (BWPS) for Thyrotoxicosis: 30 points, suggestive of impending thyroid storm during initial evaluation, however HR improved s/p IVF, score currently 5  -monitor on telemetry  -trend bilirubin, can fractionate if it remains elevated  -consider adding propanolol if HR remains elevated and BP can tolerate

## 2023-07-27 NOTE — CHART NOTE - NSCHARTNOTEFT_GEN_A_CORE
Chart reviewed.    Preliminary recommendations:  - Check FT4  - Check TSH receptor antibody and TSI  - start beta blocker if tachycardia recurs    Full consult to follow    Bennie Farrar MD  Endocrine Fellow  Can be reached via teams. For follow up questions, discharge recommendations, or new consults, please call answering service at [phone] (weekdays); [phone] (nights/weekends).

## 2023-07-27 NOTE — PROGRESS NOTE ADULT - PROBLEM SELECTOR PLAN 1
Pt with nausea, vomiting, and palpitations  -T3: 348  -T4: 13.87  -ordered US thyroid  -Endocrine consult in the AM   Matamoros-Wartofsky Point Scale (BWPS) for Thyrotoxicosis: 30 points, suggestive of impending thyroid storm during initial evaluation   -monitor on telemetry  -trend bilirubin, can fractionate if it remains elevated  -consider adding propanolol if HR remains elevated and BP can tolerate Pt with nausea, vomiting, and palpitations  -T3: 348  -T4: 13.87  - US thyroid showing heterogeneous and hyperemic thyroid gland findings can be seen in the   setting of thyroiditis, subcentimeter TR 4 right thyroid nodule, isoechoic structure in the lower pole of the left lobe, which may represent a solid nodule (TR 3). Recommend follow-up sonogram in a year. Possible biopsy.  -Endocrine consult in the AM - f/u antibodies, free thyroxine, TSI  Matamoros-Wartofsky Point Scale (BWPS) for Thyrotoxicosis: 30 points, suggestive of impending thyroid storm during initial evaluation   -monitor on telemetry  -f/u ECHO  -trend bilirubin, can fractionate if it remains elevated  -consider adding propanolol if HR remains elevated and BP can tolerate

## 2023-07-27 NOTE — H&P ADULT - TIME BILLING
Preparing to see the patient including review of tests and other providers' notes, confirming history with patient, performing medical examination and evaluation, counseling and educating the patient, ordering tests and procedures, documenting clinical information in the EMR, independently interpreting results and communicating results to the patient, care coordination

## 2023-07-27 NOTE — CONSULT NOTE ADULT - SUBJECTIVE AND OBJECTIVE BOX
HPI:  Pt is a 63-year-old female with no known PMHx who comes in with dizziness, nausea, vomiting over the past three weeks. She initially attributed her symptoms to the heat and weather. She reports that she would intermittently experience these symptoms for a day, it would tianna and then reoccur the following day.  Patient notes her left lower extremity is also more swollen than it has been in the past. She denies any history of VTE.   Pt denied chest pain and SOB but endorsed palpitations which have been ongoing as well for the past few weeks.   Pt denies recent travel or sick contacts.    (2023 04:33)      Consulted for: Hyperthyroidism    Endocrine hx:  Patient reports palpitations, n/v, dizziness, SOB for past 3 weeks. No symptoms at rest, but symptomatic upon any exertion, movement.  No inciting illness - denies URI, diarrhea, fever/chills, dysuria, recent travel or sick contacts. Denies weight loss, heat or cold intolerance, hair loss, neck pain, dysphagia.  Denies hx of thyroid disorders.    Home meds: None    No family history of thyroid disorders.    PCP: Bienvenido Guevara      PAST MEDICAL & SURGICAL HISTORY:  Migraine  H/O eye surgery  S/P  section  Hysterectomy       FAMILY HISTORY:  FH: Parkinson's disease (Father)        Social History:  Lives alone  No smoking, No alcohol use, No recreational drug use      Outpatient Medications: None      MEDICATIONS  (STANDING):  metoprolol tartrate 12.5 milliGRAM(s) Oral every 12 hours    MEDICATIONS  (PRN):      Allergies    No Known Allergies    Intolerances      Review of Systems:  Constitutional: No fever  Eyes: No blurry vision  Neuro: No tremors  HEENT: No pain  Cardiovascular: + chest pain, palpitations  Respiratory: +SOB, no cough  GI: + nausea, vomiting. No abdominal pain, diarrhea  : No dysuria  Skin: no rash  Psych: no depression  Endocrine: no polyuria, polydipsia  Hem/lymph: no swelling  Osteoporosis: no fractures    ALL OTHER SYSTEMS REVIEWED AND NEGATIVE    PHYSICAL EXAM:  VITALS: T(C): 36.7 (23 @ 11:30)  T(F): 98.1 (23 @ 11:30), Max: 98.6 (23 @ 01:15)  HR: 92 (23 @ 11:30) (65 - 133)  BP: 114/74 (23 @ 11:30) (108/59 - 136/80)  RR:  (15 - 20)  SpO2:  (96% - 100%)  Wt(kg): --  GENERAL: NAD, well-groomed, well-developed  EYES: No proptosis, no lid lag, anicteric  HEENT:  Atraumatic, Normocephalic, moist mucous membranes  THYROID: Normal size, symmetric, no palpable nodules  RESPIRATORY: Clear to auscultation bilaterally; No rales, rhonchi, wheezing  CARDIOVASCULAR: Slightly tachycardic, Regular rhythm; No murmurs; no peripheral edema  GI: Soft, nontender, non distended  SKIN: Dry, intact, No rashes or lesions  MUSCULOSKELETAL: Full range of motion, normal strength  NEURO: extraocular movements intact, no tremor  PSYCH: Alert and oriented x 3, normal affect, normal mood  CUSHING'S SIGNS: no striae      CAPILLARY BLOOD GLUCOSE                                12.1   6.87  )-----------(  07:10 )             36.3           140  |  100  |  18  ----------------------------<  125<H>  3.6   |  22  |  0.61    eGFR: 100    Ca    10.3          TPro  7.2  /  Alb  3.9  /  TBili  1.8<H>  /  DBili  x   /  AST  29  /  ALT  35<H>  /  AlkPhos  90        Thyroid Function Tests:   @ 07:10 TSH -- FreeT4 3.3 T3 -- Anti TPO -- Anti Thyroglobulin Ab -- TSI --   @ 21:14 TSH <0.10 FreeT4 -- T3 348 Anti TPO -- Anti Thyroglobulin Ab -- TSI --              Radiology:

## 2023-07-28 ENCOUNTER — TRANSCRIPTION ENCOUNTER (OUTPATIENT)
Age: 63
End: 2023-07-28

## 2023-07-28 DIAGNOSIS — E05.90 THYROTOXICOSIS, UNSPECIFIED WITHOUT THYROTOXIC CRISIS OR STORM: ICD-10-CM

## 2023-07-28 PROBLEM — G43.909 MIGRAINE, UNSPECIFIED, NOT INTRACTABLE, WITHOUT STATUS MIGRAINOSUS: Chronic | Status: ACTIVE | Noted: 2023-07-27

## 2023-07-28 LAB
ALBUMIN SERPL ELPH-MCNC: 3.4 G/DL — SIGNIFICANT CHANGE UP (ref 3.3–5)
ALP SERPL-CCNC: 83 U/L — SIGNIFICANT CHANGE UP (ref 40–120)
ALT FLD-CCNC: 39 U/L — HIGH (ref 4–33)
ANION GAP SERPL CALC-SCNC: 10 MMOL/L — SIGNIFICANT CHANGE UP (ref 7–14)
AST SERPL-CCNC: 30 U/L — SIGNIFICANT CHANGE UP (ref 4–32)
BILIRUB SERPL-MCNC: 1.3 MG/DL — HIGH (ref 0.2–1.2)
BUN SERPL-MCNC: 16 MG/DL — SIGNIFICANT CHANGE UP (ref 7–23)
CALCIUM SERPL-MCNC: 9.6 MG/DL — SIGNIFICANT CHANGE UP (ref 8.4–10.5)
CHLORIDE SERPL-SCNC: 103 MMOL/L — SIGNIFICANT CHANGE UP (ref 98–107)
CO2 SERPL-SCNC: 24 MMOL/L — SIGNIFICANT CHANGE UP (ref 22–31)
CREAT SERPL-MCNC: 0.54 MG/DL — SIGNIFICANT CHANGE UP (ref 0.5–1.3)
EGFR: 103 ML/MIN/1.73M2 — SIGNIFICANT CHANGE UP
GLUCOSE SERPL-MCNC: 100 MG/DL — HIGH (ref 70–99)
HCV AB S/CO SERPL IA: 0.1 S/CO — SIGNIFICANT CHANGE UP (ref 0–0.99)
HCV AB SERPL-IMP: SIGNIFICANT CHANGE UP
MAGNESIUM SERPL-MCNC: 1.9 MG/DL — SIGNIFICANT CHANGE UP (ref 1.6–2.6)
PHOSPHATE SERPL-MCNC: 4 MG/DL — SIGNIFICANT CHANGE UP (ref 2.5–4.5)
POTASSIUM SERPL-MCNC: 3.7 MMOL/L — SIGNIFICANT CHANGE UP (ref 3.5–5.3)
POTASSIUM SERPL-SCNC: 3.7 MMOL/L — SIGNIFICANT CHANGE UP (ref 3.5–5.3)
PROT SERPL-MCNC: 6.5 G/DL — SIGNIFICANT CHANGE UP (ref 6–8.3)
SODIUM SERPL-SCNC: 137 MMOL/L — SIGNIFICANT CHANGE UP (ref 135–145)

## 2023-07-28 PROCEDURE — 99233 SBSQ HOSP IP/OBS HIGH 50: CPT

## 2023-07-28 PROCEDURE — 99233 SBSQ HOSP IP/OBS HIGH 50: CPT | Mod: GC

## 2023-07-28 RX ORDER — CYCLOBENZAPRINE HYDROCHLORIDE 10 MG/1
5 TABLET, FILM COATED ORAL ONCE
Refills: 0 | Status: COMPLETED | OUTPATIENT
Start: 2023-07-28 | End: 2023-07-29

## 2023-07-28 RX ADMIN — Medication 12.5 MILLIGRAM(S): at 18:17

## 2023-07-28 RX ADMIN — Medication 12.5 MILLIGRAM(S): at 06:17

## 2023-07-28 NOTE — DISCHARGE NOTE PROVIDER - NSDCCPCAREPLAN_GEN_ALL_CORE_FT
PRINCIPAL DISCHARGE DIAGNOSIS  Diagnosis: Hyperthyroidism  Assessment and Plan of Treatment: Your thyroid is a butterfly shaped organ in your neck that makes hormones that regulates your body's metabolism. Hyperthyroidism occurs when the thyroid gland makes too much thyroid hormone. This condition can cause you to be overactive, lose weight, have issues sleeping, eye problems, and fast heart rate. In some cases this can also cause an enlarged thyroid gland that you may be able to see and feel at the front of your neck.  This is most often caused by Graves' disease. In Grave's disease the boy's immune system attacks the thyroid gland. You were prescribed a beta-blocker medicine to slow down your heart rate. For treatment of your hyperthyroidism, you were prescribed ___. In some cases doctors recommend radioactive iodine or surgery to remove the thyroid, after which you may need to take thryoid replacement hormone for the rest of your life.  It is important that you take all medications as directed. Please also attend all scheduled follow-up appointments so you can be treated for your condition.  Return to the ED and seek immediate medical care if you have nausea, vomiting, excessive sweating, feeling extremely restless or confused, develop a fever, fast heartbeat, sudden vision changes or eye pain.     PRINCIPAL DISCHARGE DIAGNOSIS  Diagnosis: Hyperthyroidism  Assessment and Plan of Treatment: Your thyroid is a butterfly shaped organ in your neck that makes hormones that regulates your body's metabolism. Hyperthyroidism occurs when the thyroid gland makes too much thyroid hormone. This condition can cause you to be overactive, lose weight, have issues sleeping, eye problems, and fast heart rate. In some cases this can also cause an enlarged thyroid gland that you may be able to see and feel at the front of your neck.  This is most often caused by Graves' disease. In Grave's disease the boy's immune system attacks the thyroid gland. You were prescribed a beta-blocker medicine to slow down your heart rate. In some cases doctors recommend radioactive iodine or surgery to remove the thyroid, after which you may need to take thryoid replacement hormone for the rest of your life.  It is important that you take all medications as directed. We have sent a prescription for metoprolol to [facility] - please pick this up and take it twice daily. Please also attend all scheduled follow-up appointments so you can be treated for your condition.  Return to the ED and seek immediate medical care if you have nausea, vomiting, excessive sweating, feeling extremely restless or confused, develop a fever, fast heartbeat, sudden vision changes or eye pain.

## 2023-07-28 NOTE — DISCHARGE NOTE PROVIDER - NSDCCPTREATMENT_GEN_ALL_CORE_FT
PRINCIPAL PROCEDURE  Procedure: Thyroid US  Findings and Treatment: 7/27/23  IMPRESSION:  Heterogeneous and hyperemic thyroid gland findings can be seen in the   setting of thyroiditis.  A subcentimeter TR 4 right thyroid nodule.  An isoechoic structure in the lower pole of the left lobe, which may   represent a solid nodule (TR 3). Recommend follow-up sonogram in a year.  TI-RAD 4: Moderately suspicious (FNA if > 1.5 cm, Follow if > 1 cm)

## 2023-07-28 NOTE — DISCHARGE NOTE PROVIDER - NSDCMRMEDTOKEN_GEN_ALL_CORE_FT
Fioricet oral tablet: 1 tab(s) orally once a day as needed for  headache last took &gt;3 weeks ago for migraine   Fioricet oral tablet: 1 tab(s) orally once a day as needed for  headache last took &gt;3 weeks ago for migraine  Metoprolol Tartrate 25 mg oral tablet: 1 tab(s) orally 2 times a day

## 2023-07-28 NOTE — DISCHARGE NOTE PROVIDER - HOSPITAL COURSE
Pt is a 63-year-old female with no known PMHx who comes in with dizziness, nausea, vomiting over the past three weeks. She initially attributed her symptoms to the heat and weather. She reports that she would intermittently experience these symptoms for a day, it would tianna and then reoccur the following day.  Patient notes her left lower extremity is also more swollen than it has been in the past. She denies any history of VTE. Pt denied chest pain and SOB but endorsed palpitations which have been ongoing as well for the past few weeks. Pt denies recent travel or sick contacts. In the ED, CTPE was negative, BNP within normal limits, and patient tachycardic to 100's. Her thyroid labs came back abnormal, T3 348, T4 13.87. [place]-Wartofsky Point Scale (BWPS) for Thyrotoxicosis calculated as 30 points, suggestive of impending thyroid storm during initial evaluation, however HR improved s/p IVF, score improved to 5. Bilirubin was also elevated.    During her admission, endocrine was consulted and workup for hyperthyroidism came back with elevated free thyroxine to 3.3 and TSH <0.10. Metoprolol was started for tachycardia, and HR improved on telemetry. Patient's nausea, vomiting, and palpitations improved.  Patient's bilirubin also trended down. Thyroid US showed hypermic thyroid and possible hot nodule mostly concerning for Grave's disease. Patient was started on ___ per endocrine. She also had an episode of dizziness, so orthostatics ere ordered which showed __. Maalox was given for persistent feelings of dysphagia.  Patient is stable and ready for discharge with plans for scheduled follow-up with Endocrine outpatient. Pt is a 63-year-old female with no known PMHx who comes in with dizziness, nausea, vomiting over the past three weeks. She initially attributed her symptoms to the heat and weather. She reports that she would intermittently experience these symptoms for a day, it would tianna and then reoccur the following day.  Patient notes her left lower extremity is also more swollen than it has been in the past. She denies any history of VTE. Pt denied chest pain and SOB but endorsed palpitations which have been ongoing as well for the past few weeks. Pt denies recent travel or sick contacts. In the ED, CTPE was negative, BNP within normal limits, and patient tachycardic to 100's. Her thyroid labs came back abnormal, T3 348, T4 13.87. [place]-Wartofsky Point Scale (BWPS) for Thyrotoxicosis calculated as 30 points, suggestive of impending thyroid storm during initial evaluation, however HR improved s/p IVF, score improved to 5. Bilirubin was also elevated.    During her admission, endocrine was consulted and workup for hyperthyroidism came back with elevated free thyroxine to 3.3 and TSH <0.10. Metoprolol was started for tachycardia, and HR improved on telemetry. Patient's nausea, vomiting, and palpitations improved.  Patient's bilirubin also trended down. Thyroid US showed hypermic thyroid and possible hot nodule mostly concerning for Grave's disease. Patient was started on ___ per endocrine. She also had an episode of dizziness but orthostatics negative. Maalox was given for persistent feelings of dysphagia.  Patient is stable and ready for discharge with plans for scheduled follow-up with Endocrine outpatient. Pt is a 63-year-old female with no known PMHx who comes in with dizziness, nausea, vomiting over the past three weeks. She initially attributed her symptoms to the heat and weather. She reports that she would intermittently experience these symptoms for a day, it would tianna and then reoccur the following day.  Patient notes her left lower extremity is also more swollen than it has been in the past. She denies any history of VTE. Pt denied chest pain and SOB but endorsed palpitations which have been ongoing as well for the past few weeks. Pt denies recent travel or sick contacts. In the ED, CTPE was negative, BNP within normal limits, and patient tachycardic to 100's. Her thyroid labs came back abnormal, T3 348, T4 13.87. [place]-Wartofsky Point Scale (BWPS) for Thyrotoxicosis calculated as 30 points, suggestive of impending thyroid storm during initial evaluation, however HR improved s/p IVF, score improved to 5. Bilirubin was also elevated.    During her admission, endocrine was consulted and workup for hyperthyroidism came back with elevated free thyroxine to 3.3 and TSH <0.10. Metoprolol was started for tachycardia, and HR improved on telemetry. Patient's nausea, vomiting, and palpitations improved.  Patient's bilirubin also trended down. Thyroid US showed hypermic thyroid and possible hot nodule mostly concerning for Grave's disease. She also had an episode of dizziness but orthostatics negative. Maalox was given for persistent feelings of dysphagia.  Patient is stable and ready for discharge with plans for scheduled follow-up with Endocrine outpatient.

## 2023-07-28 NOTE — DISCHARGE NOTE PROVIDER - CARE PROVIDER_API CALL
Marshall Knapp  Endocrinology/Metab/Diabetes  [address] [facility], [address]nd Floor, [place] [address]  Phone: [phone]  Fax: [phone]  Follow Up Time: 2 weeks   Marshall Knapp  Endocrinology/Metab/Diabetes  [address] [facility], [address]nd Floor, [place] [address]  Phone: [phone]  Fax: [phone]  Scheduled Appointment: 08/15/2023 09:00 AM

## 2023-07-28 NOTE — DISCHARGE NOTE PROVIDER - PROVIDER TOKENS
PROVIDER:[TOKEN:[[id number]],FOLLOWUP:[2 weeks]] PROVIDER:[TOKEN:[[id number]],SCHEDULEDAPPT:[08/15/2023],SCHEDULEDAPPTTIME:[09:00 AM]]

## 2023-07-28 NOTE — PROGRESS NOTE ADULT - SUBJECTIVE AND OBJECTIVE BOX
Patient is a 63y old  Female who presents with a chief complaint of dizziness, N/V (27 Jul 2023 17:12)      SUBJECTIVE / OVERNIGHT EVENTS:    Still tachycardic at rest, started metoprolol.     MEDICATIONS  (STANDING):  metoprolol tartrate 12.5 milliGRAM(s) Oral every 12 hours    MEDICATIONS  (PRN):      CAPILLARY BLOOD GLUCOSE        I&O's Summary      PHYSICAL EXAM:  Vital Signs Last 24 Hrs  T(C): 36.7 (28 Jul 2023 06:15), Max: 36.9 (27 Jul 2023 21:25)  T(F): 98 (28 Jul 2023 06:15), Max: 98.4 (27 Jul 2023 21:25)  HR: 90 (28 Jul 2023 06:15) (89 - 92)  BP: 118/69 (28 Jul 2023 06:15) (104/63 - 137/71)  BP(mean): --  RR: 18 (28 Jul 2023 06:15) (17 - 18)  SpO2: 98% (28 Jul 2023 06:15) (97% - 99%)    Parameters below as of 28 Jul 2023 06:15  Patient On (Oxygen Delivery Method): room air        GENERAL: No acute distress, well-developed  HEAD:  Atraumatic, Normocephalic  EYES: EOMI, PERRLA, conjunctiva and sclera clear  NECK: Supple, no lymphadenopathy, no JVD  CHEST/LUNG: CTAB; No wheezes, rales, or rhonchi  HEART: Regular rate and rhythm; No murmurs, rubs, or gallops  ABDOMEN: Soft, non-tender, non-distended; normal bowel sounds, no organomegaly  EXTREMITIES:  2+ peripheral pulses b/l, No clubbing, cyanosis, or edema  NEUROLOGY: A&O x 3, no focal deficits  SKIN: No rashes or lesions    LABS:                        12.1   6.87  )-----------( 277      ( 27 Jul 2023 07:10 )             36.3     07-28    137  |  103  |  16  ----------------------------<  100<H>  3.7   |  24  |  0.54    Ca    9.6      28 Jul 2023 06:00  Phos  4.0     07-28  Mg     1.90     07-28    TPro  6.5  /  Alb  3.4  /  TBili  1.3<H>  /  DBili  x   /  AST  30  /  ALT  39<H>  /  AlkPhos  83  07-28    PT/INR - ( 26 Jul 2023 21:14 )   PT: 13.4 sec;   INR: 1.19 ratio         PTT - ( 26 Jul 2023 21:14 )  PTT:27.4 sec      Urinalysis Basic - ( 28 Jul 2023 06:00 )    Color: x / Appearance: x / SG: x / pH: x  Gluc: 100 mg/dL / Ketone: x  / Bili: x / Urobili: x   Blood: x / Protein: x / Nitrite: x   Leuk Esterase: x / RBC: x / WBC x   Sq Epi: x / Non Sq Epi: x / Bacteria: x          RADIOLOGY & ADDITIONAL TESTS:  Results Reviewed:   Imaging Personally Reviewed:  Electrocardiogram Personally Reviewed:    COORDINATION OF CARE:  Care Discussed with Consultants/Other Providers [Y/N]:  Prior or Outpatient Records Reviewed [Y/N]:   Patient is a 63y old  Female who presents with a chief complaint of dizziness, N/V (27 Jul 2023 17:12)      SUBJECTIVE / OVERNIGHT EVENTS:    No palpitations, started metoprolol yesterday. Concerns for dysphagia, but no acute events overnight.     MEDICATIONS  (STANDING):  metoprolol tartrate 12.5 milliGRAM(s) Oral every 12 hours    MEDICATIONS  (PRN):      CAPILLARY BLOOD GLUCOSE        I&O's Summary      PHYSICAL EXAM:  Vital Signs Last 24 Hrs  T(C): 36.7 (28 Jul 2023 06:15), Max: 36.9 (27 Jul 2023 21:25)  T(F): 98 (28 Jul 2023 06:15), Max: 98.4 (27 Jul 2023 21:25)  HR: 90 (28 Jul 2023 06:15) (89 - 92)  BP: 118/69 (28 Jul 2023 06:15) (104/63 - 137/71)  BP(mean): --  RR: 18 (28 Jul 2023 06:15) (17 - 18)  SpO2: 98% (28 Jul 2023 06:15) (97% - 99%)    Parameters below as of 28 Jul 2023 06:15  Patient On (Oxygen Delivery Method): room air        GENERAL: No acute distress, well-developed  HEAD:  Atraumatic, Normocephalic  EYES: EOMI, PERRLA, conjunctiva and sclera clear  NECK: Supple, no lymphadenopathy, no JVD  CHEST/LUNG: CTAB; No wheezes, rales, or rhonchi  HEART: Regular rate and rhythm; No murmurs, rubs, or gallops  ABDOMEN: Soft, non-tender, non-distended; normal bowel sounds, no organomegaly  EXTREMITIES:  2+ peripheral pulses b/l, No clubbing, cyanosis, or edema. Left knee mild swelling.  NEUROLOGY: A&O x 3, no focal deficits  SKIN: No rashes or lesions    LABS:                        12.1   6.87  )-----------( 277      ( 27 Jul 2023 07:10 )             36.3     07-28    137  |  103  |  16  ----------------------------<  100<H>  3.7   |  24  |  0.54    Ca    9.6      28 Jul 2023 06:00  Phos  4.0     07-28  Mg     1.90     07-28    TPro  6.5  /  Alb  3.4  /  TBili  1.3<H>  /  DBili  x   /  AST  30  /  ALT  39<H>  /  AlkPhos  83  07-28    PT/INR - ( 26 Jul 2023 21:14 )   PT: 13.4 sec;   INR: 1.19 ratio         PTT - ( 26 Jul 2023 21:14 )  PTT:27.4 sec      Urinalysis Basic - ( 28 Jul 2023 06:00 )    Color: x / Appearance: x / SG: x / pH: x  Gluc: 100 mg/dL / Ketone: x  / Bili: x / Urobili: x   Blood: x / Protein: x / Nitrite: x   Leuk Esterase: x / RBC: x / WBC x   Sq Epi: x / Non Sq Epi: x / Bacteria: x          RADIOLOGY & ADDITIONAL TESTS:  Results Reviewed:   Imaging Personally Reviewed:  Electrocardiogram Personally Reviewed:    COORDINATION OF CARE:  Care Discussed with Consultants/Other Providers [Y/N]:  Prior or Outpatient Records Reviewed [Y/N]:

## 2023-07-28 NOTE — DISCHARGE NOTE PROVIDER - NSDCFUSCHEDAPPT_GEN_ALL_CORE_FT
Marshall Knapp  [facility]  END[address]CRIN [address]  Scheduled Appointment: 08/15/2023

## 2023-07-28 NOTE — PROGRESS NOTE ADULT - TIME BILLING
Time-based billing (NON-critical care).     50 minutes spent on total encounter; more than 50% of the visit was spent counseling and / or coordinating care by the attending physician.  The necessity of the time spent during the encounter on this date of service was due to:     review of laboratory data, radiology results, consultants' recommendations, documentation in [place], discussion with patient, resident team, and interdisciplinary staff (such as , social workers, etc). Interventions were performed as documented above.

## 2023-07-28 NOTE — DISCHARGE NOTE PROVIDER - ATTENDING DISCHARGE PHYSICAL EXAMINATION:
PHYSICAL EXAM:  Vital Signs Last 24 Hrs  T(C): 36.4 (29 Jul 2023 11:30), Max: 36.8 (29 Jul 2023 06:14)  T(F): 97.5 (29 Jul 2023 11:30), Max: 98.3 (29 Jul 2023 06:14)  HR: 87 (29 Jul 2023 11:30) (81 - 97)  BP: 108/64 (29 Jul 2023 11:30) (106/50 - 141/61)  BP(mean): --  RR: 18 (29 Jul 2023 11:30) (17 - 19)  SpO2: 95% (29 Jul 2023 11:30) (95% - 100%)    Parameters below as of 29 Jul 2023 11:30  Patient On (Oxygen Delivery Method): room air      CONSTITUTIONAL: NAD, well-developed, well-groomed  EYES: PERRLA; conjunctiva and sclera clear  ENMT: Moist oral mucosa, no pharyngeal injection or exudates; normal dentition  NECK: Supple, no palpable masses; no thyromegaly  RESPIRATORY: Normal respiratory effort; lungs are clear to auscultation bilaterally  CARDIOVASCULAR: Regular rate and rhythm, normal S1 and S2, no murmur/rub/gallop; No lower extremity edema; Peripheral pulses are 2+ bilaterally  ABDOMEN: Nontender to palpation, normoactive bowel sounds, no rebound/guarding; No hepatosplenomegaly  MUSCULOSKELETAL:  no clubbing or cyanosis of digits; no joint swelling or tenderness to palpation  PSYCH: A+O to person, place, and time; affect appropriate  NEUROLOGY: CN 2-12 are intact and symmetric; no gross sensory deficits   SKIN: No rashes; no palpable lesions

## 2023-07-28 NOTE — PROGRESS NOTE ADULT - PROBLEM SELECTOR PLAN 1
Pt with nausea, vomiting, and palpitations  -T3: 348  -T4: 13.87  - TSH <0.10  - free thyroxine 3.3  - US thyroid showing hyperemic thyroid, L solid nodule, R hypoechoic nodule c/f Grave's vs hot nodule - no FNA at this point  - Still tachycardic to 100's, started metoprolol 12.5mg BID  -Endocrine consult, appreciate recs - f/u antibodies, TSI  - f/u free T4 for methimazole   - Mat[place]-Wartofsky Point Scale (BWPS) for Thyrotoxicosis: 30 points, suggestive of impending thyroid storm during initial evaluation   -monitor on telemetry  -ECHO grossly normal  -mildly elevated bilirubin - monitor as this affects potential thionamide use Pt with nausea, vomiting, and palpitations  -T3: 348  -T4: 13.87  - TSH <0.10  - free thyroxine 3.3  - US thyroid showing hyperemic thyroid, L solid nodule, R hypoechoic nodule c/f Grave's vs hot nodule - no FNA at this point  - deferring thyroid uptake study due to having received recent contrast  - no longer tachycardic - started metoprolol 12.5mg BID  -Endocrine consult, appreciate recs - f/u antibodies, TSI  - free thyroxine 3.3 - can consider methimazole, dosing per endocrine  - [place]-Wartofsky Point Scale (BWPS) for Thyrotoxicosis: 30 points, suggestive of impending thyroid storm during initial evaluation   -monitor on telemetry  -ECHO grossly normal  -mildly elevated bilirubin - monitor as this affects potential thionamide use

## 2023-07-28 NOTE — PROGRESS NOTE ADULT - SUBJECTIVE AND OBJECTIVE BOX
Admitted for: Thyrotoxicosis without thyroid storm        Following for:    Subjective:       MEDICATIONS  (STANDING):  metoprolol tartrate 12.5 milliGRAM(s) Oral every 12 hours    MEDICATIONS  (PRN):      Allergies    No Known Allergies    Intolerances          PHYSICAL EXAM:  VITALS: T(C): 36.7 (07-28-23 @ 06:15)  T(F): 98 (07-28-23 @ 06:15), Max: 98.4 (07-27-23 @ 21:25)  HR: 90 (07-28-23 @ 06:15) (89 - 92)  BP: 118/69 (07-28-23 @ 06:15) (104/63 - 137/71)  RR:  (17 - 18)  SpO2:  (97% - 99%)  Wt(kg): --  GENERAL: NAD  EYES: No proptosis, no lid lag, anicteric  RESPIRATORY: Clear to auscultation bilaterally  CARDIOVASCULAR: Regular rate and rhythm  GI: Soft, nontender, non distended  EXT: b/l feet without wounds, 2+ pulses  PSYCH: Alert and oriented x 3, reactive affect              07-28    137  |  103  |  16  ----------------------------<  100<H>  3.7   |  24  |  0.54    eGFR: 103    Ca    9.6      07-28  Mg     1.90     07-28  Phos  4.0     07-28    TPro  6.5  /  Alb  3.4  /  TBili  1.3<H>  /  DBili  x   /  AST  30  /  ALT  39<H>  /  AlkPhos  83  07-28      Thyroid Function Tests:  07-27 @ 07:10 TSH -- FreeT4 3.3 T3 -- Anti TPO -- Anti Thyroglobulin Ab -- TSI --  07-26 @ 21:14 TSH <0.10 FreeT4 -- T3 348 Anti TPO -- Anti Thyroglobulin Ab -- TSI --                         Admitted for: Thyrotoxicosis without thyroid storm        Following for: Hyperthyroidism    Subjective:   - Patient reports improvement in palpitations since starting metoprolol. However, still has some palpitation when walking around.      MEDICATIONS  (STANDING):  metoprolol tartrate 12.5 milliGRAM(s) Oral every 12 hours    MEDICATIONS  (PRN):      Allergies    No Known Allergies    Intolerances          PHYSICAL EXAM:  VITALS: T(C): 36.7 (07-28-23 @ 06:15)  T(F): 98 (07-28-23 @ 06:15), Max: 98.4 (07-27-23 @ 21:25)  HR: 90 (07-28-23 @ 06:15) (89 - 92)  BP: 118/69 (07-28-23 @ 06:15) (104/63 - 137/71)  RR:  (17 - 18)  SpO2:  (97% - 99%)  Wt(kg): --  GENERAL: NAD, well-groomed, well-developed  EYES: No proptosis, no lid lag, anicteric  HEENT:  Atraumatic, Normocephalic, moist mucous membranes  THYROID: Normal size, symmetric, no palpable nodules  RESPIRATORY: Clear to auscultation bilaterally; No rales, rhonchi, wheezing  CARDIOVASCULAR: Slightly tachycardic, Regular rhythm; No murmurs; no peripheral edema  GI: Soft, nontender, non distended  SKIN: Dry, intact, No rashes or lesions  MUSCULOSKELETAL: Full range of motion, normal strength  NEURO: extraocular movements intact, no tremor  PSYCH: Alert and oriented x 3, normal affect, normal mood  CUSHING'S SIGNS: no striae              07-28    137  |  103  |  16  ----------------------------<  100<H>  3.7   |  24  |  0.54    eGFR: 103    Ca    9.6      07-28  Mg     1.90     07-28  Phos  4.0     07-28    TPro  6.5  /  Alb  3.4  /  TBili  1.3<H>  /  DBili  x   /  AST  30  /  ALT  39<H>  /  AlkPhos  83  07-28      Thyroid Function Tests:  07-27 @ 07:10 TSH -- FreeT4 3.3 T3 -- Anti TPO -- Anti Thyroglobulin Ab -- TSI --  07-26 @ 21:14 TSH <0.10 FreeT4 -- T3 348 Anti TPO -- Anti Thyroglobulin Ab -- TSI --

## 2023-07-29 ENCOUNTER — TRANSCRIPTION ENCOUNTER (OUTPATIENT)
Age: 63
End: 2023-07-29

## 2023-07-29 VITALS
SYSTOLIC BLOOD PRESSURE: 108 MMHG | OXYGEN SATURATION: 95 % | TEMPERATURE: 98 F | RESPIRATION RATE: 18 BRPM | HEART RATE: 87 BPM | DIASTOLIC BLOOD PRESSURE: 64 MMHG

## 2023-07-29 LAB
ALBUMIN SERPL ELPH-MCNC: 3.4 G/DL — SIGNIFICANT CHANGE UP (ref 3.3–5)
ALP SERPL-CCNC: 83 U/L — SIGNIFICANT CHANGE UP (ref 40–120)
ALT FLD-CCNC: 28 U/L — SIGNIFICANT CHANGE UP (ref 4–33)
ANION GAP SERPL CALC-SCNC: 21 MMOL/L — HIGH (ref 7–14)
AST SERPL-CCNC: 23 U/L — SIGNIFICANT CHANGE UP (ref 4–32)
B-OH-BUTYR SERPL-SCNC: <0 MMOL/L — SIGNIFICANT CHANGE UP (ref 0–0.4)
BASE EXCESS BLDV CALC-SCNC: 4.8 MMOL/L — HIGH (ref -2–3)
BILIRUB SERPL-MCNC: 1 MG/DL — SIGNIFICANT CHANGE UP (ref 0.2–1.2)
BLOOD GAS VENOUS COMPREHENSIVE RESULT: SIGNIFICANT CHANGE UP
BUN SERPL-MCNC: 16 MG/DL — SIGNIFICANT CHANGE UP (ref 7–23)
CALCIUM SERPL-MCNC: 9.5 MG/DL — SIGNIFICANT CHANGE UP (ref 8.4–10.5)
CHLORIDE BLDV-SCNC: 102 MMOL/L — SIGNIFICANT CHANGE UP (ref 96–108)
CHLORIDE SERPL-SCNC: 97 MMOL/L — LOW (ref 98–107)
CO2 BLDV-SCNC: 32.5 MMOL/L — HIGH (ref 22–26)
CO2 SERPL-SCNC: 25 MMOL/L — SIGNIFICANT CHANGE UP (ref 22–31)
CREAT SERPL-MCNC: 0.57 MG/DL — SIGNIFICANT CHANGE UP (ref 0.5–1.3)
EGFR: 101 ML/MIN/1.73M2 — SIGNIFICANT CHANGE UP
GAS PNL BLDV: 136 MMOL/L — SIGNIFICANT CHANGE UP (ref 136–145)
GAS PNL BLDV: SIGNIFICANT CHANGE UP
GLUCOSE BLDV-MCNC: 97 MG/DL — SIGNIFICANT CHANGE UP (ref 70–99)
GLUCOSE SERPL-MCNC: 96 MG/DL — SIGNIFICANT CHANGE UP (ref 70–99)
HCO3 BLDV-SCNC: 31 MMOL/L — HIGH (ref 22–29)
HCT VFR BLD CALC: 36.3 % — SIGNIFICANT CHANGE UP (ref 34.5–45)
HCT VFR BLDA CALC: 37 % — SIGNIFICANT CHANGE UP (ref 34.5–46.5)
HGB BLD CALC-MCNC: 12.3 G/DL — SIGNIFICANT CHANGE UP (ref 11.7–16.1)
HGB BLD-MCNC: 11.8 G/DL — SIGNIFICANT CHANGE UP (ref 11.5–15.5)
LACTATE BLDV-MCNC: 1.3 MMOL/L — SIGNIFICANT CHANGE UP (ref 0.5–2)
MAGNESIUM SERPL-MCNC: 2.2 MG/DL — SIGNIFICANT CHANGE UP (ref 1.6–2.6)
MCHC RBC-ENTMCNC: 28.3 PG — SIGNIFICANT CHANGE UP (ref 27–34)
MCHC RBC-ENTMCNC: 32.5 GM/DL — SIGNIFICANT CHANGE UP (ref 32–36)
MCV RBC AUTO: 87.1 FL — SIGNIFICANT CHANGE UP (ref 80–100)
NRBC # BLD: 0 /100 WBCS — SIGNIFICANT CHANGE UP (ref 0–0)
NRBC # FLD: 0 K/UL — SIGNIFICANT CHANGE UP (ref 0–0)
PCO2 BLDV: 51 MMHG — SIGNIFICANT CHANGE UP (ref 39–52)
PH BLDV: 7.39 — SIGNIFICANT CHANGE UP (ref 7.32–7.43)
PHOSPHATE SERPL-MCNC: 4.1 MG/DL — SIGNIFICANT CHANGE UP (ref 2.5–4.5)
PLATELET # BLD AUTO: 232 K/UL — SIGNIFICANT CHANGE UP (ref 150–400)
PO2 BLDV: 39 MMHG — SIGNIFICANT CHANGE UP (ref 25–45)
POTASSIUM BLDV-SCNC: 4.6 MMOL/L — SIGNIFICANT CHANGE UP (ref 3.5–5.1)
POTASSIUM SERPL-MCNC: 4.3 MMOL/L — SIGNIFICANT CHANGE UP (ref 3.5–5.3)
POTASSIUM SERPL-SCNC: 4.3 MMOL/L — SIGNIFICANT CHANGE UP (ref 3.5–5.3)
PROT SERPL-MCNC: 6.3 G/DL — SIGNIFICANT CHANGE UP (ref 6–8.3)
RBC # BLD: 4.17 M/UL — SIGNIFICANT CHANGE UP (ref 3.8–5.2)
RBC # FLD: 11.3 % — SIGNIFICANT CHANGE UP (ref 10.3–14.5)
SAO2 % BLDV: 64.8 % — LOW (ref 67–88)
SODIUM SERPL-SCNC: 143 MMOL/L — SIGNIFICANT CHANGE UP (ref 135–145)
T3 SERPL-MCNC: 184 NG/DL — SIGNIFICANT CHANGE UP (ref 80–200)
T4 FREE SERPL-MCNC: 2.1 NG/DL — HIGH (ref 0.9–1.8)
WBC # BLD: 7.55 K/UL — SIGNIFICANT CHANGE UP (ref 3.8–10.5)
WBC # FLD AUTO: 7.55 K/UL — SIGNIFICANT CHANGE UP (ref 3.8–10.5)

## 2023-07-29 PROCEDURE — 99233 SBSQ HOSP IP/OBS HIGH 50: CPT

## 2023-07-29 PROCEDURE — 99239 HOSP IP/OBS DSCHRG MGMT >30: CPT

## 2023-07-29 RX ORDER — METOPROLOL TARTRATE 50 MG
1 TABLET ORAL
Qty: 60 | Refills: 0
Start: 2023-07-29 | End: 2023-08-27

## 2023-07-29 RX ORDER — SODIUM CHLORIDE 9 MG/ML
1000 INJECTION INTRAMUSCULAR; INTRAVENOUS; SUBCUTANEOUS
Refills: 0 | Status: DISCONTINUED | OUTPATIENT
Start: 2023-07-29 | End: 2023-07-29

## 2023-07-29 RX ADMIN — SODIUM CHLORIDE 100 MILLILITER(S): 9 INJECTION INTRAMUSCULAR; INTRAVENOUS; SUBCUTANEOUS at 10:18

## 2023-07-29 RX ADMIN — CYCLOBENZAPRINE HYDROCHLORIDE 5 MILLIGRAM(S): 10 TABLET, FILM COATED ORAL at 00:56

## 2023-07-29 RX ADMIN — Medication 12.5 MILLIGRAM(S): at 06:14

## 2023-07-29 NOTE — PROGRESS NOTE ADULT - PROBLEM SELECTOR PLAN 1
Pt with nausea, vomiting, and palpitations  -T3: 348  -T4: 13.87  - TSH <0.10  - free thyroxine 3.3  - US thyroid showing hyperemic thyroid, L solid nodule, R hypoechoic nodule c/f Grave's vs hot nodule - no FNA at this point  - deferring thyroid uptake study due to having received recent contrast  - no longer tachycardic - started metoprolol 12.5mg BID  -Endocrine consult, appreciate recs - f/u antibodies, TSI  - free thyroxine 3.3 - can consider methimazole, dosing per endocrine  - [place]-Wartofsky Point Scale (BWPS) for Thyrotoxicosis: 30 points, suggestive of impending thyroid storm during initial evaluation   -monitor on telemetry  -ECHO grossly normal  -mildly elevated bilirubin - monitor as this affects potential thionamide use

## 2023-07-29 NOTE — PROGRESS NOTE ADULT - ASSESSMENT
Pt is a 63-year-old female with no known PMHx who comes in with dizziness, nausea, vomiting over the past several weeks. Found to have elevated T3 and T4, c/f thyrotoxicosis 
63F with no known PMHx who was in usual state of health until developing n/v, dizziness, palpitations, SOB on exertion 3 weeks ago. Found to have hyperthyroidism with TSH <0.10, FT4 pending.    #Hyperthyroidism  TSH <0.10, TT3 elevated 348, FT4 elevated to 3.3  TUS: hyperemic thyroid, L solid nodule 1.8 x 1.6, R hypoechoic nodule 0.4 x 0.4 x 0.5  Most likely Grave's disease given overall hyperemic thyroid.  Could be also be hyperfunctioning thyroid nodule but less likely.  - c/w metoprolol 12.5mg BID. Monitor BP.  - Repeated free thyroxine 2.1 downtrend from 3.3  T3 downtrend to 184 from 348   Possible thyroiditis given quick resolution of level of hyperthyroidism  Will therefore not start methimazole at this time  - F/u TSH receptor, TSI labs are testing in lab, can be followed up as outpatient.  - Follow up scheduled with Dr. Knapp at [address] [place] [address] on Tuesday 8/15 at 9am. Phone number is [phone].  - Repeat TFTs at visit with Dr. Knapp to trend further  - Thyroid nodules do not need FNA at this time.  - Received contrast so cannot perform Thyroid Uptake Scan until 6 weeks later (outpatient).  - Trend CBC, LFT's as this is important to monitor in setting of potential thionamide use    #Tachycardia  - c/w metoprolol 12.5mg BID.  prescribe this on discharge    #Elevated LFT  - Trend LFT    DC plan reviewed with primary team.    Domingo Bee MD  Division of Endocrinology  Pager: [phone]    If after 6PM or before 9AM, or on weekends/holidays, please call endocrine answering service for assistance ([phone]).  For nonurgent matters email [email or web address] for assistance.
63F with no known PMHx who was in usual state of health until developing n/v, dizziness, palpitations, SOB on exertion 3 weeks ago. Found to have hyperthyroidism with TSH <0.10, FT4 pending.    #Hyperthyroidism  TSH <0.10, TT3 elevated 348, FT4 elevated to 3.3  TUS: hyperemic thyroid, L solid nodule 1.8 x 1.6, R hypoechoic nodule 0.4 x 0.4 x 0.5  Most likely Grave's disease given overall hyperemic thyroid.  Could be also be hyperfunctioning thyroid nodule but less likely.  - c/w metoprolol 12.5mg BID. Monitor BP.  - Repeat free thyroxine and TT3 tomorrow AM. Will consider possible thionamide tx based on levels.  - F/u TSH receptor, TSI  - Follow up scheduled with Dr. Knapp at [address] [place] [address] on Tuesday 8/15 at 9am. Phone number is [phone].  - Thyroid nodules do not need FNA at this time.  - Received contrast so cannot perform Thyroid Uptake Scan until 6 weeks later (outpatient).  - Trend CBC, LFT's as this is important to monitor in setting of potential thionamide use    #Tachycardia  - c/w metoprolol 12.5mg BID. Monitor BP.    #Elevated LFT  - Trend LFT    Discussed with Dr. Jacob Farrar MD  Endocrine Fellow  Can be reached via teams. For follow up questions, discharge recommendations, or new consults, please call answering service at [phone] (weekdays); [phone] (nights/weekends).

## 2023-07-29 NOTE — PROGRESS NOTE ADULT - PROBLEM SELECTOR PROBLEM 2
Need for prophylactic measure
Tachycardia
Tachycardia
Need for prophylactic measure
Need for prophylactic measure

## 2023-07-29 NOTE — DISCHARGE NOTE NURSING/CASE MANAGEMENT/SOCIAL WORK - PATIENT PORTAL LINK FT
You can access the FollowMyHealth Patient Portal offered by [facility] by registering at the following website: [email or web address]. By joining [employer]’s FollowMyHealth portal, you will also be able to view your health information using other applications (apps) compatible with our system.

## 2023-07-29 NOTE — PROGRESS NOTE ADULT - ATTENDING COMMENTS
63F with new hyperthyroidism, start low dose beta blocker for symptoms.  Differential Graves disease vs hot nodule vs thyroiditis.  Thyroid nodules seen on ultrasound can be further assessed as outpatient.  Uptake and scan to be considered as outpatient (cannot do now due to receiving iodinated contrast), FNA would only be considered if uptake and scan show a cold nodule. Follow up antibodies.  Mild elevation bilirubin noted.  Repeat free T4 and total T3 in AM.  Patient would like to follow up in [place] with endocrine.    Domingo Bee MD  Division of Endocrinology  Pager: [phone]    If after 6PM or before 9AM, or on weekends/holidays, please call endocrine answering service for assistance ([phone]).  For nonurgent matters email [email or web address] for [email or web address].
Pt seen/examined at bedside.      63F w/no PMH (has not seen MD in years) presents with thyrotoxicosis.  Endo consult appreciated.   TSH receptor Ab and TSI ordered.     Reports an episode of dizziness when trying to get up to drink water. Feels a sensation of something stuck in her epigastrium still.  Will check orthostatic VS.    Thyroid U/S results noted:  Heterogeneous and hyperemic thyroid gland findings can be seen in the   setting of thyroiditis.  A subcentimeter TR 4 right thyroid nodule.  An isoechoic structure in the lower pole of the left lobe, which may   represent a solid nodule (TR 3). Recommend follow-up sonogram in a year.    Monitor on tele. Added BB - titrate upward as warranted. Free T4 elevated - appreciate endocrine input regarding whether to initiate methimazole.     Likely discharge to home tomorrow.
Pt seen/examined at bedside.      63F w/no PMH (has not seen MD in years) presents with thyrotoxicosis.  Endo consult appreciated.   TSH receptor Ab and TSI ordered.     Thyroid U/S results noted:  Heterogeneous and hyperemic thyroid gland findings can be seen in the   setting of thyroiditis.  A subcentimeter TR 4 right thyroid nodule.  An isoechoic structure in the lower pole of the left lobe, which may   represent a solid nodule (TR 3). Recommend follow-up sonogram in a year.    Monitor on tele. Added BB - titrate upward as warranted. Free T4 elevated - no plan at present for methimazole. Outpatient endocrinology f/u.    No further dizziness. Orthostatic vital signs normal.    Discharge to home today.
Pt seen/examined at bedside.      63F w/no PMH (has not seen MD in years) presents with thyrotoxicosis.  Endo consult appreciated.   TSH receptor Ab and TSI ordered.     Thyroid U/S results noted:  Heterogeneous and hyperemic thyroid gland findings can be seen in the   setting of thyroiditis.  A subcentimeter TR 4 right thyroid nodule.  An isoechoic structure in the lower pole of the left lobe, which may   represent a solid nodule (TR 3). Recommend follow-up sonogram in a yea    Monitor on tele. Added BB. TTE pending.

## 2023-07-29 NOTE — PROGRESS NOTE ADULT - PROBLEM SELECTOR PLAN 2
Diet: regular  DVT: SCD  Dispo: pending clinical work up

## 2023-07-29 NOTE — PROGRESS NOTE ADULT - SUBJECTIVE AND OBJECTIVE BOX
***INCOMPLETE***    Marcellus Bran, PGY-3  Internal Medicine  Pager: [phone], [facility]: [phone]    PROGRESS NOTE:     SUBJECTIVE / OVERNIGHT EVENTS: No acute events overnight.     MEDICATIONS  (STANDING):  metoprolol tartrate 12.5 milliGRAM(s) Oral every 12 hours    MEDICATIONS  (PRN):  aluminum hydroxide/magnesium hydroxide/simethicone Suspension 30 milliLiter(s) Oral every 6 hours PRN Dyspepsia      CAPILLARY BLOOD GLUCOSE        I&O's Summary      PHYSICAL EXAM:  Vital Signs Last 24 Hrs  T(C): 36.8 (29 Jul 2023 06:14), Max: 36.8 (29 Jul 2023 06:14)  T(F): 98.3 (29 Jul 2023 06:14), Max: 98.3 (29 Jul 2023 06:14)  HR: 84 (29 Jul 2023 06:14) (81 - 97)  BP: 120/71 (29 Jul 2023 06:14) (106/50 - 141/61)  BP(mean): --  RR: 17 (29 Jul 2023 06:14) (17 - 19)  SpO2: 100% (29 Jul 2023 06:14) (97% - 100%)    Parameters below as of 29 Jul 2023 06:14  Patient On (Oxygen Delivery Method): room air        GENERAL: No acute distress, well-developed  HEAD:  Atraumatic, Normocephalic  EYES: EOMI, PERRLA, conjunctiva and sclera clear  NECK: Supple, no lymphadenopathy, no JVD  CHEST/LUNG: CTAB; No wheezes, rales, or rhonchi  HEART: Regular rate and rhythm; No murmurs, rubs, or gallops  ABDOMEN: Soft, non-tender, non-distended; normal bowel sounds, no organomegaly  EXTREMITIES:  2+ peripheral pulses b/l, No clubbing, cyanosis, or edema. Left knee mild swelling.  NEUROLOGY: A&O x 3, no focal deficits  SKIN: No rashes or lesions    LABS:    07-28    137  |  103  |  16  ----------------------------<  100<H>  3.7   |  24  |  0.54    Ca    9.6      28 Jul 2023 06:00  Phos  4.0     07-28  Mg     1.90     07-28    TPro  6.5  /  Alb  3.4  /  TBili  1.3<H>  /  DBili  x   /  AST  30  /  ALT  39<H>  /  AlkPhos  83  07-28          Urinalysis Basic - ( 28 Jul 2023 06:00 )    Color: x / Appearance: x / SG: x / pH: x  Gluc: 100 mg/dL / Ketone: x  / Bili: x / Urobili: x   Blood: x / Protein: x / Nitrite: x   Leuk Esterase: x / RBC: x / WBC x   Sq Epi: x / Non Sq Epi: x / Bacteria: x           Marcellus Bran, PGY-3  Internal Medicine  Pager: [phone], J: [phone]    PROGRESS NOTE:     SUBJECTIVE / OVERNIGHT EVENTS: No acute events overnight. Patient feels well today, no further palpitations or subjective dizziness.    MEDICATIONS  (STANDING):  metoprolol tartrate 12.5 milliGRAM(s) Oral every 12 hours    MEDICATIONS  (PRN):  aluminum hydroxide/magnesium hydroxide/simethicone Suspension 30 milliLiter(s) Oral every 6 hours PRN Dyspepsia      CAPILLARY BLOOD GLUCOSE        I&O's Summary      PHYSICAL EXAM:  Vital Signs Last 24 Hrs  T(C): 36.8 (29 Jul 2023 06:14), Max: 36.8 (29 Jul 2023 06:14)  T(F): 98.3 (29 Jul 2023 06:14), Max: 98.3 (29 Jul 2023 06:14)  HR: 84 (29 Jul 2023 06:14) (81 - 97)  BP: 120/71 (29 Jul 2023 06:14) (106/50 - 141/61)  BP(mean): --  RR: 17 (29 Jul 2023 06:14) (17 - 19)  SpO2: 100% (29 Jul 2023 06:14) (97% - 100%)    Parameters below as of 29 Jul 2023 06:14  Patient On (Oxygen Delivery Method): room air        GENERAL: No acute distress, well-developed  HEAD:  Atraumatic, Normocephalic  EYES: EOMI, PERRLA, conjunctiva and sclera clear  NECK: Supple, no lymphadenopathy, no JVD  CHEST/LUNG: CTAB; No wheezes, rales, or rhonchi  HEART: Regular rate and rhythm; No murmurs, rubs, or gallops  ABDOMEN: Soft, non-tender, non-distended; normal bowel sounds, no organomegaly  EXTREMITIES:  2+ peripheral pulses b/l, No clubbing, cyanosis, or edema. Left knee mild swelling.  NEUROLOGY: A&O x 3, no focal deficits  SKIN: No rashes or lesions    LABS:    07-28    137  |  103  |  16  ----------------------------<  100<H>  3.7   |  24  |  0.54    Ca    9.6      28 Jul 2023 06:00  Phos  4.0     07-28  Mg     1.90     07-28    TPro  6.5  /  Alb  3.4  /  TBili  1.3<H>  /  DBili  x   /  AST  30  /  ALT  39<H>  /  AlkPhos  83  07-28          Urinalysis Basic - ( 28 Jul 2023 06:00 )    Color: x / Appearance: x / SG: x / pH: x  Gluc: 100 mg/dL / Ketone: x  / Bili: x / Urobili: x   Blood: x / Protein: x / Nitrite: x   Leuk Esterase: x / RBC: x / WBC x   Sq Epi: x / Non Sq Epi: x / Bacteria: x

## 2023-07-29 NOTE — PROGRESS NOTE ADULT - SUBJECTIVE AND OBJECTIVE BOX
Chief Complaint: Hyperthyroidism    History: no overnight events  feeling well  for discharge home today    MEDICATIONS  (STANDING):  metoprolol tartrate 12.5 milliGRAM(s) Oral every 12 hours    MEDICATIONS  (PRN):  aluminum hydroxide/magnesium hydroxide/simethicone Suspension 30 milliLiter(s) Oral every 6 hours PRN Dyspepsia      Allergies    No Known Allergies    Intolerances      Review of Systems:    ALL OTHER SYSTEMS REVIEWED AND NEGATIVE      PHYSICAL EXAM:  VITALS: T(C): 36.4 (07-29-23 @ 11:30)  T(F): 97.5 (07-29-23 @ 11:30), Max: 98.3 (07-29-23 @ 06:14)  HR: 87 (07-29-23 @ 11:30) (81 - 97)  BP: 108/64 (07-29-23 @ 11:30) (106/50 - 141/61)  RR:  (17 - 19)  SpO2:  (95% - 100%)  Wt(kg): --  GENERAL: NAD, well-groomed, well-developed  EYES: No proptosis, no lid lag, anicteric  HEENT:  Atraumatic, Normocephalic, moist mucous membranes  RESPIRATORY: nonlabored respirations, no wheezing  PSYCH: Alert and oriented x 3, normal affect, normal mood    CAPILLARY BLOOD GLUCOSE          07-29    143  |  97<L>  |  16  ----------------------------<  96  4.3   |  25  |  0.57    eGFR: 101    Ca    9.5      07-29  Mg     2.20     07-29  Phos  4.1     07-29    TPro  6.3  /  Alb  3.4  /  TBili  1.0  /  DBili  x   /  AST  23  /  ALT  28  /  AlkPhos  83  07-29          Thyroid Function Tests:  07-29 @ 07:08 TSH -- FreeT4 2.1 T3 184 Anti TPO -- Anti Thyroglobulin Ab -- TSI --  07-27 @ 07:10 TSH -- FreeT4 3.3 T3 -- Anti TPO -- Anti Thyroglobulin Ab -- TSI --

## 2023-07-30 LAB — TSI ACT/NOR SER: 24.3 IU/L — HIGH (ref 0–0.55)

## 2023-07-31 LAB — TSH RECEP AB FLD-ACNC: 28 IU/L — HIGH (ref 0–1.75)

## 2023-08-14 PROBLEM — Z00.00 ENCOUNTER FOR PREVENTIVE HEALTH EXAMINATION: Status: ACTIVE | Noted: 2023-08-14

## 2023-08-15 ENCOUNTER — APPOINTMENT (OUTPATIENT)
Dept: ENDOCRINOLOGY | Facility: CLINIC | Age: 63
End: 2023-08-15
Payer: COMMERCIAL

## 2023-08-15 ENCOUNTER — NON-APPOINTMENT (OUTPATIENT)
Age: 63
End: 2023-08-15

## 2023-08-15 VITALS
BODY MASS INDEX: 29.95 KG/M2 | DIASTOLIC BLOOD PRESSURE: 75 MMHG | OXYGEN SATURATION: 98 % | SYSTOLIC BLOOD PRESSURE: 120 MMHG | HEART RATE: 101 BPM | HEIGHT: 63 IN | WEIGHT: 169 LBS

## 2023-08-15 PROCEDURE — 99204 OFFICE O/P NEW MOD 45 MIN: CPT

## 2023-08-15 PROCEDURE — 99214 OFFICE O/P EST MOD 30 MIN: CPT

## 2023-08-15 RX ORDER — METOPROLOL TARTRATE 25 MG/1
25 TABLET, FILM COATED ORAL
Refills: 0 | Status: ACTIVE | COMMUNITY

## 2023-08-15 NOTE — ASSESSMENT
[FreeTextEntry1] : 63F with no known PMHx who was in usual state of health until developing n/v, dizziness, palpitations, SOB on exertion several weeks ago. Found to have hyperthyroidism at [facility]. Referred for outpatient f/u.  #Hyperthyroidism, 2/2 graves Also need to r/o hot nodule, can co-occur. Inpatient, TSH <0.10, TT3 elevated 348. TRAb elevated at 28. TSI elevated at 24.3. FT4 came down to 2.1 with beta blocker alone. TT3 came down to 184. TUS: hyperemic thyroid, L solid nodule 1.8 x 1.6, R hypoechoic nodule 0.4 x 0.4 x 0.5 Had a CT PE protocol on July 26 so cannot have RAIU until Sept 6 or later. -Repeat TSH, FT4 and TT3 today and start methimazole based on levels. Discussed risks and benefits with patient. In particular, informed patient of possibility of rash, urticaria, fever, arthralgias, transient leukopenia. Informed her that 0.3% of patients on methimazole develop agranulocytosis and she should stop the medication and call her doctor right away if she develops fevers, chills or sore throat. Also informed her that methimazole can cause hepatitis and she should inform her doctor at once if she develops jaundice. Advised patient that she should avoid getting pregnant while taking methimazole. -Continue metoprolol 25mg BID, will increase if symptoms not controlled with methimazole -Get RAIU around Sept 6 (had CT PE in the hospital). Will need to hold methimazole several days before RAIU -DXA scan referral given, is post-menopausal also  #Thyroid nodules Unclear if larger nodule pseudonodule. Smaller nodule is subcentimeter. To me, on independent review of images, larger one appears to be true nodule, 1.8cm. Warrants biopsy by CARLOS guidelines if not hot nodule given size. Will check RAIU first.  Return in 6 weeks  Marshall Knapp,

## 2023-08-15 NOTE — HISTORY OF PRESENT ILLNESS
[FreeTextEntry1] : 63F with no known PMHx who was in usual state of health until developing n/v, dizziness, palpitations, SOB on exertion several weeks ago. Found to have hyperthyroidism at [facility]. Referred for outpatient f/u.  Inpatient, TSH <0.10, TT3 elevated 348. TRAb elevated at 28. TSI elevated at 24.3. FT4 came down to 2.1 with beta blocker alone. TT3 came down to 184. TUS: hyperemic thyroid, L solid nodule 1.8 x 1.6, R hypoechoic nodule 0.4 x 0.4 x 0.5 Had a CT PE protocol on July 26 so cannot have RAIU until Sept 6 or later.  Taking metoprolol 25mg BID. Still with N/V. Having tachycardia/palpitations, LORENZO. No heat intolerance, sweating. No significant weight loss, increase in stool frequency, dysphagia, dysphonia or dyspnea. No use of supplements, lithium, amiodarone, immune checkpoint inhibitors, URI, hx of radiation to head or neck. Prior RUIZ, thyroid surgery or prior treatment of hyperthyroidism or hypothyroidism.

## 2023-08-16 LAB
ALBUMIN SERPL ELPH-MCNC: 3.9 G/DL
ALP BLD-CCNC: 114 U/L
ALT SERPL-CCNC: 39 U/L
ANION GAP SERPL CALC-SCNC: 12 MMOL/L
AST SERPL-CCNC: 30 U/L
BILIRUB SERPL-MCNC: 1.5 MG/DL
BUN SERPL-MCNC: 22 MG/DL
CALCIUM SERPL-MCNC: 10.6 MG/DL
CHLORIDE SERPL-SCNC: 103 MMOL/L
CO2 SERPL-SCNC: 24 MMOL/L
CREAT SERPL-MCNC: 0.54 MG/DL
EGFR: 103 ML/MIN/1.73M2
GLUCOSE SERPL-MCNC: 116 MG/DL
POTASSIUM SERPL-SCNC: 4.5 MMOL/L
PROT SERPL-MCNC: 6.7 G/DL
SODIUM SERPL-SCNC: 139 MMOL/L
T3 SERPL-MCNC: 638 NG/DL
T4 FREE SERPL-MCNC: 6.4 NG/DL
TSH SERPL-ACNC: <0.01 UIU/ML

## 2023-09-07 ENCOUNTER — APPOINTMENT (OUTPATIENT)
Dept: ORTHOPEDIC SURGERY | Facility: CLINIC | Age: 63
End: 2023-09-07
Payer: COMMERCIAL

## 2023-09-07 DIAGNOSIS — M25.532 PAIN IN LEFT WRIST: ICD-10-CM

## 2023-09-07 PROCEDURE — 99203 OFFICE O/P NEW LOW 30 MIN: CPT

## 2023-09-07 NOTE — PHYSICAL EXAM
[de-identified] : Patient presents in a sling and splint splint was removed and loosened significantly.  Patient immediately felt less pressure on her wrist.  No evidence of any neurovascular compromise no evidence of skin break.

## 2023-09-07 NOTE — DISCUSSION/SUMMARY
[de-identified] : Patient was told that she has a fractured wrist we have no radiographs or radiographic to confirm this.  Patient was sent for radiographs today we will notify the patient of the findings later today or tomorrow and recommend therapeutic treatment based on the findings.  Patient will also tomorrow be given some extra oxy codon 5/325 for pain relief if deemed necessary.  Today's consultation lasted 35 minutes.

## 2023-09-07 NOTE — REASON FOR VISIT
[Initial Visit] : an initial visit for [Other: ____] : [unfilled] [FreeTextEntry2] : LEFT WRIST FRACTURE FROM FALL ON SUNDAY. SHE STATES SHE WENT TO [place] WHEN SHE NOTICE THE WRIST SWELLED UP

## 2023-09-07 NOTE — HISTORY OF PRESENT ILLNESS
[de-identified] : First time visit for this 63-year-old female she is here status post 5 days prolonged course.  Patient was taken to local emergency room and she was told that she had a fracture and placed in a splint.  She is also given a sling.  Patient has been taking oxycodone 5 mg/325 for pain.  She denies any obvious splint occasional tingling sensation in the wrist.

## 2023-09-13 ENCOUNTER — RESULT REVIEW (OUTPATIENT)
Age: 63
End: 2023-09-13

## 2023-09-13 ENCOUNTER — OUTPATIENT (OUTPATIENT)
Dept: OUTPATIENT SERVICES | Facility: HOSPITAL | Age: 63
LOS: 1 days | End: 2023-09-13
Payer: COMMERCIAL

## 2023-09-13 ENCOUNTER — APPOINTMENT (OUTPATIENT)
Dept: NUCLEAR MEDICINE | Facility: HOSPITAL | Age: 63
End: 2023-09-13
Payer: COMMERCIAL

## 2023-09-13 DIAGNOSIS — E05.00 THYROTOXICOSIS WITH DIFFUSE GOITER WITHOUT THYROTOXIC CRISIS OR STORM: ICD-10-CM

## 2023-09-13 DIAGNOSIS — Z98.890 OTHER SPECIFIED POSTPROCEDURAL STATES: Chronic | ICD-10-CM

## 2023-09-13 DIAGNOSIS — Z98.891 HISTORY OF UTERINE SCAR FROM PREVIOUS SURGERY: Chronic | ICD-10-CM

## 2023-09-13 DIAGNOSIS — E04.2 NONTOXIC MULTINODULAR GOITER: ICD-10-CM

## 2023-09-14 ENCOUNTER — RESULT REVIEW (OUTPATIENT)
Age: 63
End: 2023-09-14

## 2023-09-14 ENCOUNTER — APPOINTMENT (OUTPATIENT)
Dept: NUCLEAR MEDICINE | Facility: HOSPITAL | Age: 63
End: 2023-09-14

## 2023-09-14 PROCEDURE — 78014 THYROID IMAGING W/BLOOD FLOW: CPT | Mod: 26

## 2023-09-14 PROCEDURE — 78014 THYROID IMAGING W/BLOOD FLOW: CPT

## 2023-09-14 PROCEDURE — A9516: CPT

## 2023-09-15 ENCOUNTER — NON-APPOINTMENT (OUTPATIENT)
Age: 63
End: 2023-09-15

## 2023-09-21 ENCOUNTER — APPOINTMENT (OUTPATIENT)
Dept: ORTHOPEDIC SURGERY | Facility: CLINIC | Age: 63
End: 2023-09-21
Payer: COMMERCIAL

## 2023-09-21 VITALS — BODY MASS INDEX: 30.12 KG/M2 | WEIGHT: 170 LBS | HEIGHT: 63 IN | RESPIRATION RATE: 16 BRPM

## 2023-09-21 DIAGNOSIS — Z78.9 OTHER SPECIFIED HEALTH STATUS: ICD-10-CM

## 2023-09-21 DIAGNOSIS — Z86.39 PERSONAL HISTORY OF OTHER ENDOCRINE, NUTRITIONAL AND METABOLIC DISEASE: ICD-10-CM

## 2023-09-21 PROCEDURE — 99214 OFFICE O/P EST MOD 30 MIN: CPT | Mod: 25

## 2023-09-21 PROCEDURE — 29065 APPL CST SHO TO HAND LNG ARM: CPT

## 2023-09-21 PROCEDURE — 73110 X-RAY EXAM OF WRIST: CPT | Mod: LT

## 2023-09-26 ENCOUNTER — APPOINTMENT (OUTPATIENT)
Dept: ENDOCRINOLOGY | Facility: CLINIC | Age: 63
End: 2023-09-26
Payer: COMMERCIAL

## 2023-09-26 PROCEDURE — 99214 OFFICE O/P EST MOD 30 MIN: CPT

## 2023-09-27 LAB
ALBUMIN SERPL ELPH-MCNC: 4.3 G/DL
ALP BLD-CCNC: 184 U/L
ALT SERPL-CCNC: 36 U/L
ANION GAP SERPL CALC-SCNC: 11 MMOL/L
AST SERPL-CCNC: 28 U/L
BILIRUB SERPL-MCNC: 0.5 MG/DL
BUN SERPL-MCNC: 14 MG/DL
CALCIUM SERPL-MCNC: 10.1 MG/DL
CHLORIDE SERPL-SCNC: 104 MMOL/L
CO2 SERPL-SCNC: 25 MMOL/L
CREAT SERPL-MCNC: 0.64 MG/DL
EGFR: 99 ML/MIN/1.73M2
GLUCOSE SERPL-MCNC: 132 MG/DL
POTASSIUM SERPL-SCNC: 4.6 MMOL/L
PROT SERPL-MCNC: 6.9 G/DL
SODIUM SERPL-SCNC: 140 MMOL/L
T3 SERPL-MCNC: 170 NG/DL
T4 FREE SERPL-MCNC: 1.5 NG/DL
TSH SERPL-ACNC: <0.01 UIU/ML

## 2023-10-05 ENCOUNTER — APPOINTMENT (OUTPATIENT)
Dept: ORTHOPEDIC SURGERY | Facility: CLINIC | Age: 63
End: 2023-10-05
Payer: COMMERCIAL

## 2023-10-05 PROCEDURE — 73110 X-RAY EXAM OF WRIST: CPT | Mod: LT

## 2023-10-05 PROCEDURE — 29075 APPL CST ELBW FNGR SHORT ARM: CPT

## 2023-10-19 ENCOUNTER — APPOINTMENT (OUTPATIENT)
Dept: ORTHOPEDIC SURGERY | Facility: CLINIC | Age: 63
End: 2023-10-19
Payer: COMMERCIAL

## 2023-10-19 PROCEDURE — 73110 X-RAY EXAM OF WRIST: CPT | Mod: LT

## 2023-10-19 PROCEDURE — 99213 OFFICE O/P EST LOW 20 MIN: CPT

## 2023-11-16 ENCOUNTER — APPOINTMENT (OUTPATIENT)
Dept: ORTHOPEDIC SURGERY | Facility: CLINIC | Age: 63
End: 2023-11-16
Payer: COMMERCIAL

## 2023-11-16 ENCOUNTER — LABORATORY RESULT (OUTPATIENT)
Age: 63
End: 2023-11-16

## 2023-11-16 DIAGNOSIS — M25.522 PAIN IN LEFT ELBOW: ICD-10-CM

## 2023-11-16 DIAGNOSIS — S69.92XD UNSPECIFIED INJURY OF LEFT WRIST, HAND AND FINGER(S), SUBSEQUENT ENCOUNTER: ICD-10-CM

## 2023-11-16 PROCEDURE — 73070 X-RAY EXAM OF ELBOW: CPT | Mod: LT

## 2023-11-16 PROCEDURE — 73110 X-RAY EXAM OF WRIST: CPT | Mod: LT

## 2023-11-16 PROCEDURE — 20612 ASPIRATE/INJ GANGLION CYST: CPT

## 2024-03-19 ENCOUNTER — APPOINTMENT (OUTPATIENT)
Dept: ENDOCRINOLOGY | Facility: CLINIC | Age: 64
End: 2024-03-19
Payer: COMMERCIAL

## 2024-03-19 VITALS
DIASTOLIC BLOOD PRESSURE: 67 MMHG | BODY MASS INDEX: 32.25 KG/M2 | SYSTOLIC BLOOD PRESSURE: 111 MMHG | TEMPERATURE: 97.7 F | OXYGEN SATURATION: 97 % | RESPIRATION RATE: 18 BRPM | HEART RATE: 74 BPM | HEIGHT: 63 IN | WEIGHT: 182 LBS

## 2024-03-19 DIAGNOSIS — E04.2 NONTOXIC MULTINODULAR GOITER: ICD-10-CM

## 2024-03-19 DIAGNOSIS — E05.00 THYROTOXICOSIS WITH DIFFUSE GOITER W/OUT THYROTOXIC CRISIS OR STORM: ICD-10-CM

## 2024-03-19 DIAGNOSIS — Z13.820 ENCOUNTER FOR SCREENING FOR OSTEOPOROSIS: ICD-10-CM

## 2024-03-19 PROCEDURE — 99214 OFFICE O/P EST MOD 30 MIN: CPT

## 2024-03-19 PROCEDURE — G2211 COMPLEX E/M VISIT ADD ON: CPT

## 2024-03-19 RX ORDER — ACETAMINOPHEN AND CODEINE PHOSPHATE 300; 30 MG/1; MG/1
300-30 TABLET ORAL
Qty: 20 | Refills: 0 | Status: DISCONTINUED | COMMUNITY
Start: 2023-09-21 | End: 2024-03-19

## 2024-03-19 RX ORDER — METHIMAZOLE 5 MG/1
5 TABLET ORAL
Qty: 540 | Refills: 1 | Status: ACTIVE | COMMUNITY
Start: 2023-08-16 | End: 1900-01-01

## 2024-03-19 RX ORDER — ACETAMINOPHEN AND CODEINE PHOSPHATE 300; 30 MG/1; MG/1
300-30 TABLET ORAL
Qty: 20 | Refills: 0 | Status: DISCONTINUED | COMMUNITY
Start: 2023-09-11 | End: 2024-03-19

## 2024-03-19 RX ORDER — METOPROLOL TARTRATE 25 MG/1
25 TABLET, FILM COATED ORAL
Qty: 180 | Refills: 1 | Status: ACTIVE | COMMUNITY
Start: 2023-08-16 | End: 1900-01-01

## 2024-03-19 NOTE — ASSESSMENT
[FreeTextEntry1] : 64F with no known PMHx who was in usual state of health until developing n/v, dizziness, palpitations, SOB on exertion several weeks ago. Found to have hyperthyroidism at [facility] Referred for outpatient f/u.  #Hyperthyroidism, 2/2 graves Inpatient, TSH <0.10, TT3 elevated 348. TRAb elevated at 28. TSI elevated at 24.3. FT4 came down to 2.1 with beta blocker alone. TT3 came down to 184. 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 -Repeat TSH, FT4 and TT3 today and adjust methimazole based on levels. Discussed risks and benefits with patient. In particular, informed patient of possibility of rash, urticaria, fever, arthralgias, transient leukopenia. Informed her that 0.3% of patients on methimazole develop agranulocytosis and she should stop the medication and call her doctor right away if she develops fevers, chills or sore throat. Also informed her that methimazole can cause hepatitis and she should inform her doctor at once if she develops jaundice. Advised patient that she should avoid getting pregnant while taking methimazole. -Continue metoprolol 50mg daily. No orthostasis. -Discussed plan to take methimazole for 18 months then do trial off to see if achieved Graves remission. -DXA scan referral given, is post-menopausal also. Broke distal left radius after falling.  #Thyroid nodules Unclear if larger nodule pseudonodule. Smaller nodule is subcentimeter. To me, on independent review of images, larger one appears to be true nodule, 1.8cm. Warrants biopsy by CARLOS guidelines. Referred to radiology for US-guided biopsy.  Return in 3 months, repeat labs in 6 weeks most likely depending on today's lab results.  Marshall Knapp DO.

## 2024-03-19 NOTE — HISTORY OF PRESENT ILLNESS
[FreeTextEntry1] : 64F with no known PMHx who was in usual state of health until developing n/v, dizziness, palpitations, SOB on exertion several weeks ago. Found to have hyperthyroidism at [facility]. Referred for outpatient f/u.  Inpatient, TSH <0.10, TT3 elevated 348. TRAb elevated at 28. TSI elevated at 24.3. FT4 came down to 2.1 with beta blocker alone. TT3 came down to 184. 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  Prescribed metoprolol 50mg BID and methimazole 15mg BID but is only taking 50mg daily of metoprolol and 10mg daily methimazole. No longer with N/V. No longer having tachycardia/palpitations. No longer having LORENZO. Heat intolerance has improved. No significant weight change, increase in stool frequency, dysphagia, dysphonia or dyspnea. No use of supplements, lithium, amiodarone, immune checkpoint inhibitors, URI, hx of radiation to head or neck. No prior RUIZ, thyroid surgery or prior treatment of hyperthyroidism or hypothyroidism. No rashes or sore throat on methimazole.

## 2024-03-19 NOTE — PHYSICAL EXAM
[Well Nourished] : well nourished [Alert] : alert [No Acute Distress] : no acute distress [EOMI] : extra ocular movement intact [No Proptosis] : no proptosis [No Respiratory Distress] : no respiratory distress [No Accessory Muscle Use] : no accessory muscle use [Normal Rate and Effort] : normal respiratory rate and effort [Cranial Nerves Intact] : cranial nerves 2-12 were intact [No Motor Deficits] : the motor exam was normal [Oriented x3] : oriented to person, place, and time [Normal Affect] : the affect was normal [Normal Mood] : the mood was normal

## 2024-04-05 LAB
ALBUMIN SERPL ELPH-MCNC: 4.4 G/DL
ALP BLD-CCNC: 150 U/L
ALT SERPL-CCNC: 16 U/L
ANION GAP SERPL CALC-SCNC: 10 MMOL/L
AST SERPL-CCNC: 19 U/L
BASOPHILS # BLD AUTO: 0.07 K/UL
BASOPHILS NFR BLD AUTO: 0.9 %
BILIRUB SERPL-MCNC: 0.8 MG/DL
BUN SERPL-MCNC: 20 MG/DL
CALCIUM SERPL-MCNC: 9.2 MG/DL
CHLORIDE SERPL-SCNC: 106 MMOL/L
CO2 SERPL-SCNC: 25 MMOL/L
CREAT SERPL-MCNC: 0.72 MG/DL
EGFR: 93 ML/MIN/1.73M2
EOSINOPHIL # BLD AUTO: 0.29 K/UL
EOSINOPHIL NFR BLD AUTO: 3.6 %
GLUCOSE SERPL-MCNC: 97 MG/DL
HCT VFR BLD CALC: 38.3 %
HGB BLD-MCNC: 12.4 G/DL
IMM GRANULOCYTES NFR BLD AUTO: 0.5 %
LYMPHOCYTES # BLD AUTO: 2.26 K/UL
LYMPHOCYTES NFR BLD AUTO: 28.4 %
MAN DIFF?: NORMAL
MCHC RBC-ENTMCNC: 31.3 PG
MCHC RBC-ENTMCNC: 32.4 GM/DL
MCV RBC AUTO: 96.7 FL
MONOCYTES # BLD AUTO: 0.74 K/UL
MONOCYTES NFR BLD AUTO: 9.3 %
NEUTROPHILS # BLD AUTO: 4.55 K/UL
NEUTROPHILS NFR BLD AUTO: 57.3 %
PLATELET # BLD AUTO: 222 K/UL
POTASSIUM SERPL-SCNC: 4.7 MMOL/L
PROT SERPL-MCNC: 7.1 G/DL
RBC # BLD: 3.96 M/UL
RBC # FLD: 12.2 %
SODIUM SERPL-SCNC: 142 MMOL/L
T3 SERPL-MCNC: 162 NG/DL
T4 FREE SERPL-MCNC: 0.7 NG/DL
TSH RECEPTOR AB: 26.9 IU/L
TSH SERPL-ACNC: 2.98 UIU/ML
TSI ACT/NOR SER: 66.1 IU/L
WBC # FLD AUTO: 7.95 K/UL

## 2024-06-13 NOTE — ED ADULT NURSE NOTE - CAS DISCH BELONGINGS RETURNED
[FreeTextEntry1] : Assessment: Diabetic pressure ulcer, left second toe. Type 2 diabetes. Onychomycosis caused by T. Rubrum. PVD bilateral  Plan:   Dorsal/plantar, lateral oblique, and lateral weightbearing x-rays views taken of the left foot to rule out any osteomyelitis and none was observed.  Shortened first metatarsal with diminished first MPJ space and lateral grade first toe was noted.  Hypertrophy of the head of the proximal phalanx of the left second toe was in evidence and a calcified blood vessel was noted in between the first and second metatarsals.    I discussed the treatment plan with the patient and his daughter in law.   I performed aseptic scalpel debridement to almost full thickness of the ulceration on the left 2nd toe.  There was no exudate, and toe did not appear to be infected at this time.  I copiously flushed it with sterile saline, applied Amerigel and a bulky sterile dressing.  I dispensed an Amerigel Wound Kit.  The patient was given oral and written instructions.  The patient is to cleanse the wound twice a day with the wound wash, followed by application the Amerigel Wound Dressing Advance Formula, then apply the sterile gauze with mild compression. This is to be performed twice a day till the wound is healed, and otherwise patient is to keep the area clean and dry.  The patient was advised to keep the area clean and dry.  I then counseled the patient on performing self-examination of the feet on a daily basis.  I explained the importance of this due to the diminished sensation and the greater susceptibility of getting an infection and having additional complications due to the medical condition that exists, especially if they lack protective sensation in their feet.  I advised the patient to notify the office right away if increased redness, swelling, pain, open wounds or discharge were observed.  I explained the importance of checking the glucose regularly and of keeping the glucose level between 90 -110 and more importantly controlling the HbA1c keeping consistent and trying to achieve as close to normal an HbA1 as possible around 6.0.  I told the patient to notify the MD if the glucose level changed to above or below those levels. I advised the patient to notify the office right away if increased redness, swelling, pain, open wounds or discharge were observed.  I discussed the treatment plan with the patient regarding the fungal nails.  I discussed oral vs topical treatment.  The patient chose the topical treatment at this time and I explained that it takes approximately 15 months for the nails to grow out.  I explained that he will be returning every other month for follow up for the nails. Debridement of each toenail on each foot was performed both mechanically and via electrical grinding.  All toenails were trimmed with a 14 cm sterile stainless steel box lock double spring nail splitter.  Then utilizing a sterile pear shaped ina tonya (this device falls under bur, surgical, general & plastic surgery.  The FDA deems this item a Class-1 device) via a 35,000 RPM electric drill and vacuum and dust extractor system all toenails were aseptically debrided removing fungal layers.  This is done to diminish the fungal load of the toenails and enhance the effects of the antifungal medication, allowing overall improvement in the degree of fungal infection as well as improve appearance and reduce discomfort and help diminish chances of secondary bacterial infection, also lessening the chance of ingrown nails, especially when performed on a regular basis.  Patient was dispensed Clarus Tolnaftate 1% solution to reduce and eliminate the fungal infection in the toenails and to improve the nail quality and decrease pain.  Patient was instructed to apply it to all toenails twice a day. Patient was dispensed Clean Sweep, which is a non-toxic antimicrobial, antibacterial and antifungal spray used to disinfect the shoe gear.  Patient was instructed to spray it into all pairs of shoes that they own once a week and if they are wearing the shoes every day, at least twice a week.  They were also dispensed written information about the product.  PTR:  2 weeks for the ulcer and 2 months for his toenails 
General Sunscreen Counseling: Recommended an SPF 30+ broad spectrum sunscreen daily to the face, ears, neck (and upper chest in females). Also recommended a water resistant, SPF 50+ sunscreen to all exposed skin, including scalp, during outdoor activities. Counseled on initial application with a cream/gel/lotion/stick-based product, as well as reapplication at least every 1.5-2 hours. \\n\\nFurther counseled on incorporating sun protective clothing, including broad-brimmed hats and UPF-rated activewear.
Detail Level: Zone
Yes
Spironolactone Counseling: Patient advised regarding risks of diarrhea, abdominal pain, hyperkalemia, birth defects (for female patients), liver toxicity and renal toxicity. The patient may need blood work to monitor liver and kidney function and potassium levels while on therapy. The patient verbalized understanding of the proper use and possible adverse effects of spironolactone.  All of the patient's questions and concerns were addressed.

## 2024-08-21 ENCOUNTER — NON-APPOINTMENT (OUTPATIENT)
Age: 64
End: 2024-08-21

## 2024-08-22 ENCOUNTER — APPOINTMENT (OUTPATIENT)
Dept: ENDOCRINOLOGY | Facility: CLINIC | Age: 64
End: 2024-08-22
Payer: COMMERCIAL

## 2024-08-22 VITALS
DIASTOLIC BLOOD PRESSURE: 83 MMHG | WEIGHT: 163.6 LBS | HEART RATE: 120 BPM | RESPIRATION RATE: 16 BRPM | HEIGHT: 63 IN | SYSTOLIC BLOOD PRESSURE: 138 MMHG | BODY MASS INDEX: 28.99 KG/M2 | OXYGEN SATURATION: 97 %

## 2024-08-22 DIAGNOSIS — Z13.820 ENCOUNTER FOR SCREENING FOR OSTEOPOROSIS: ICD-10-CM

## 2024-08-22 DIAGNOSIS — E04.2 NONTOXIC MULTINODULAR GOITER: ICD-10-CM

## 2024-08-22 DIAGNOSIS — E05.00 THYROTOXICOSIS WITH DIFFUSE GOITER W/OUT THYROTOXIC CRISIS OR STORM: ICD-10-CM

## 2024-08-22 PROCEDURE — 99214 OFFICE O/P EST MOD 30 MIN: CPT

## 2024-08-22 PROCEDURE — G2211 COMPLEX E/M VISIT ADD ON: CPT

## 2024-08-22 NOTE — ASSESSMENT
[FreeTextEntry1] : 64F with no known PMHx who was in usual state of health until developing n/v, dizziness, palpitations, SOB on exertion several weeks ago. Found to have hyperthyroidism at [facility]. Referred for outpatient f/u.  #Hyperthyroidism, 2/2 graves Inpatient, TSH <0.10, TT3 elevated 348. TRAb elevated at 28. TSI elevated at 24.3. FT4 came down to 2.1 with beta blocker alone. TT3 came down to 184. 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 -Resume methimazole. To take 10mg in the morning and 5mg in the afternoon. Continue metoprolol 25mg BID. -Repeat TSH, FT4 and TT3 in 6 weeks and adjust methimazole based on levels. Discussed risks and benefits with patient. In particular, informed patient of possibility of rash, urticaria, fever, arthralgias, transient leukopenia. Informed her that 0.3% of patients on methimazole develop agranulocytosis and she should stop the medication and call her doctor right away if she develops fevers, chills or sore throat. Also informed her that methimazole can cause hepatitis and she should inform her doctor at once if she develops jaundice. Advised patient that she should avoid getting pregnant while taking methimazole. -Discussed plan to take methimazole for 18 months then do trial off to see if achieved Graves remission. -DXA scan referral given, is post-menopausal also. Broke distal left radius after falling.  #Thyroid nodules Unclear if larger nodule pseudonodule. Smaller nodule is subcentimeter. To me, on independent review of images, larger one appears to be true nodule, 1.8cm. Warrants biopsy by CARLOS guidelines. Referred to radiology for US-guided biopsy.  Return in 3 months, repeat labs in 6 weeks.  Marshall Knapp DO.

## 2024-08-22 NOTE — HISTORY OF PRESENT ILLNESS
[FreeTextEntry1] : 64F with no known PMHx who was in usual state of health until developing n/v, dizziness, palpitations, SOB on exertion several weeks ago. Found to have hyperthyroidism at [facility]. Referred for outpatient f/u.  Inpatient, TSH <0.10, TT3 elevated 348. TRAb elevated at 28. TSI elevated at 24.3. FT4 came down to 2.1 with beta blocker alone. TT3 came down to 184. 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  Was not scheduled appropriately for FNA today so cannot do it today. Only a 20-minute slot. Will reschedule or go to radiology.  Prescribed metoprolol 50mg BID and methimazole 15mg BID but ran out of methimazole 2 days ago. Has been having symptoms of hyperthyroidism again and only was taking one tab of each med BID. No use of supplements, lithium, amiodarone, immune checkpoint inhibitors, URI, hx of radiation to head or neck. No prior RUIZ, thyroid surgery or prior treatment of hyperthyroidism or hypothyroidism. No rashes or sore throat on methimazole.

## 2024-08-22 NOTE — PHYSICAL EXAM
[Alert] : alert [Well Nourished] : well nourished [No Acute Distress] : no acute distress [EOMI] : extra ocular movement intact [No Proptosis] : no proptosis [No Respiratory Distress] : no respiratory distress [No Accessory Muscle Use] : no accessory muscle use [Normal Rate and Effort] : normal respiratory rate and effort

## 2025-04-01 ENCOUNTER — APPOINTMENT (OUTPATIENT)
Dept: ENDOCRINOLOGY | Facility: CLINIC | Age: 65
End: 2025-04-01
Payer: COMMERCIAL

## 2025-04-01 VITALS
SYSTOLIC BLOOD PRESSURE: 131 MMHG | WEIGHT: 158 LBS | RESPIRATION RATE: 18 BRPM | TEMPERATURE: 98.2 F | HEIGHT: 63 IN | BODY MASS INDEX: 28 KG/M2 | OXYGEN SATURATION: 97 % | HEART RATE: 114 BPM | DIASTOLIC BLOOD PRESSURE: 82 MMHG

## 2025-04-01 DIAGNOSIS — S62.109A FRACTURE OF UNSPECIFIED CARPAL BONE, UNSPECIFIED WRIST, INITIAL ENCOUNTER FOR CLOSED FRACTURE: ICD-10-CM

## 2025-04-01 DIAGNOSIS — E05.00 THYROTOXICOSIS WITH DIFFUSE GOITER W/OUT THYROTOXIC CRISIS OR STORM: ICD-10-CM

## 2025-04-01 DIAGNOSIS — Z13.820 ENCOUNTER FOR SCREENING FOR OSTEOPOROSIS: ICD-10-CM

## 2025-04-01 DIAGNOSIS — E04.2 NONTOXIC MULTINODULAR GOITER: ICD-10-CM

## 2025-04-01 PROCEDURE — G2211 COMPLEX E/M VISIT ADD ON: CPT | Mod: NC

## 2025-04-01 PROCEDURE — 99214 OFFICE O/P EST MOD 30 MIN: CPT

## 2025-04-11 LAB
25(OH)D3 SERPL-MCNC: 15.1 NG/ML
ALBUMIN SERPL ELPH-MCNC: 4.2 G/DL
ALP BLD-CCNC: 332 U/L
ALT SERPL-CCNC: 22 U/L
ANION GAP SERPL CALC-SCNC: 11 MMOL/L
AST SERPL-CCNC: 19 U/L
BASOPHILS # BLD AUTO: 0.04 K/UL
BASOPHILS NFR BLD AUTO: 0.5 %
BILIRUB SERPL-MCNC: 1 MG/DL
BUN SERPL-MCNC: 14 MG/DL
CALCIUM SERPL-MCNC: 10.2 MG/DL
CALCIUM SERPL-MCNC: 10.2 MG/DL
CHLORIDE SERPL-SCNC: 105 MMOL/L
CO2 SERPL-SCNC: 24 MMOL/L
CREAT SERPL-MCNC: 0.45 MG/DL
EGFRCR SERPLBLD CKD-EPI 2021: 106 ML/MIN/1.73M2
EOSINOPHIL # BLD AUTO: 0.17 K/UL
EOSINOPHIL NFR BLD AUTO: 2.3 %
GLUCOSE SERPL-MCNC: 106 MG/DL
HCT VFR BLD CALC: 39.7 %
HGB BLD-MCNC: 12.6 G/DL
IMM GRANULOCYTES NFR BLD AUTO: 0.3 %
LYMPHOCYTES # BLD AUTO: 2.36 K/UL
LYMPHOCYTES NFR BLD AUTO: 31.4 %
MAGNESIUM SERPL-MCNC: 2 MG/DL
MAN DIFF?: NORMAL
MCHC RBC-ENTMCNC: 27.8 PG
MCHC RBC-ENTMCNC: 31.7 G/DL
MCV RBC AUTO: 87.6 FL
MONOCYTES # BLD AUTO: 0.76 K/UL
MONOCYTES NFR BLD AUTO: 10.1 %
NEUTROPHILS # BLD AUTO: 4.17 K/UL
NEUTROPHILS NFR BLD AUTO: 55.4 %
PARATHYROID HORMONE INTACT: 34 PG/ML
PHOSPHATE SERPL-MCNC: 4.3 MG/DL
PLATELET # BLD AUTO: 307 K/UL
POTASSIUM SERPL-SCNC: 5.3 MMOL/L
PROT SERPL-MCNC: 7.2 G/DL
RBC # BLD: 4.53 M/UL
RBC # FLD: 12.7 %
SODIUM SERPL-SCNC: 140 MMOL/L
T3 SERPL-MCNC: 388 NG/DL
T4 FREE SERPL-MCNC: 3.1 NG/DL
TSH RECEPTOR AB: 10.3 IU/L
TSH SERPL-ACNC: <0.01 UIU/ML
TSI ACT/NOR SER: 9.53 IU/L
WBC # FLD AUTO: 7.52 K/UL

## 2025-04-30 ENCOUNTER — APPOINTMENT (OUTPATIENT)
Dept: ENDOCRINOLOGY | Facility: CLINIC | Age: 65
End: 2025-04-30
Payer: COMMERCIAL

## 2025-04-30 VITALS
DIASTOLIC BLOOD PRESSURE: 80 MMHG | HEART RATE: 81 BPM | WEIGHT: 166 LBS | BODY MASS INDEX: 29.41 KG/M2 | RESPIRATION RATE: 16 BRPM | TEMPERATURE: 98.2 F | OXYGEN SATURATION: 98 % | SYSTOLIC BLOOD PRESSURE: 146 MMHG

## 2025-04-30 DIAGNOSIS — E04.2 NONTOXIC MULTINODULAR GOITER: ICD-10-CM

## 2025-04-30 PROCEDURE — 10005 FNA BX W/US GDN 1ST LES: CPT

## 2025-05-05 LAB — FNA, THYROID: NORMAL

## 2025-05-28 ENCOUNTER — NON-APPOINTMENT (OUTPATIENT)
Age: 65
End: 2025-05-28

## 2025-07-01 ENCOUNTER — APPOINTMENT (OUTPATIENT)
Dept: ENDOCRINOLOGY | Facility: CLINIC | Age: 65
End: 2025-07-01
Payer: COMMERCIAL

## 2025-07-01 VITALS
WEIGHT: 184 LBS | OXYGEN SATURATION: 97 % | SYSTOLIC BLOOD PRESSURE: 115 MMHG | HEART RATE: 66 BPM | DIASTOLIC BLOOD PRESSURE: 76 MMHG | HEIGHT: 63 IN | TEMPERATURE: 98 F | RESPIRATION RATE: 18 BRPM | BODY MASS INDEX: 32.6 KG/M2

## 2025-07-01 PROCEDURE — G2211 COMPLEX E/M VISIT ADD ON: CPT | Mod: NC

## 2025-07-01 PROCEDURE — 99215 OFFICE O/P EST HI 40 MIN: CPT

## 2025-07-01 PROCEDURE — 36415 COLL VENOUS BLD VENIPUNCTURE: CPT

## 2025-07-01 RX ORDER — ALENDRONATE SODIUM 70 MG/1
70 TABLET ORAL
Qty: 12 | Refills: 1 | Status: ACTIVE | COMMUNITY
Start: 2025-07-01 | End: 1900-01-01

## 2025-07-03 LAB
ALBUMIN SERPL ELPH-MCNC: 4.6 G/DL
ALP BLD-CCNC: 514 U/L
ALT SERPL-CCNC: 17 U/L
ANION GAP SERPL CALC-SCNC: 12 MMOL/L
AST SERPL-CCNC: 26 U/L
BASOPHILS # BLD AUTO: 0.08 K/UL
BASOPHILS NFR BLD AUTO: 0.9 %
BILIRUB SERPL-MCNC: 1.1 MG/DL
BUN SERPL-MCNC: 15 MG/DL
CALCIUM SERPL-MCNC: 9.2 MG/DL
CHLORIDE SERPL-SCNC: 104 MMOL/L
CO2 SERPL-SCNC: 24 MMOL/L
CREAT SERPL-MCNC: 0.9 MG/DL
EGFRCR SERPLBLD CKD-EPI 2021: 71 ML/MIN/1.73M2
EOSINOPHIL # BLD AUTO: 0.22 K/UL
EOSINOPHIL NFR BLD AUTO: 2.5 %
GLUCOSE SERPL-MCNC: 99 MG/DL
HCT VFR BLD CALC: 45.3 %
HGB BLD-MCNC: 14.1 G/DL
IMM GRANULOCYTES NFR BLD AUTO: 0.3 %
LYMPHOCYTES # BLD AUTO: 2.74 K/UL
LYMPHOCYTES NFR BLD AUTO: 30.9 %
MAN DIFF?: NORMAL
MCHC RBC-ENTMCNC: 28.4 PG
MCHC RBC-ENTMCNC: 31.1 G/DL
MCV RBC AUTO: 91.1 FL
MONOCYTES # BLD AUTO: 0.58 K/UL
MONOCYTES NFR BLD AUTO: 6.5 %
NEUTROPHILS # BLD AUTO: 5.23 K/UL
NEUTROPHILS NFR BLD AUTO: 58.9 %
PLATELET # BLD AUTO: 299 K/UL
POTASSIUM SERPL-SCNC: 5.7 MMOL/L
PROT SERPL-MCNC: 7.5 G/DL
RBC # BLD: 4.97 M/UL
RBC # FLD: 15.5 %
SODIUM SERPL-SCNC: 141 MMOL/L
T3 SERPL-MCNC: 93 NG/DL
T4 FREE SERPL-MCNC: 0.3 NG/DL
TSH SERPL-ACNC: 8.48 UIU/ML
WBC # FLD AUTO: 8.88 K/UL